# Patient Record
Sex: FEMALE | Race: WHITE | NOT HISPANIC OR LATINO | Employment: FULL TIME | RURAL
[De-identification: names, ages, dates, MRNs, and addresses within clinical notes are randomized per-mention and may not be internally consistent; named-entity substitution may affect disease eponyms.]

---

## 2022-08-15 ENCOUNTER — OFFICE VISIT (OUTPATIENT)
Dept: FAMILY MEDICINE | Facility: CLINIC | Age: 27
End: 2022-08-15
Payer: COMMERCIAL

## 2022-08-15 VITALS
TEMPERATURE: 99 F | WEIGHT: 134 LBS | HEART RATE: 78 BPM | RESPIRATION RATE: 18 BRPM | OXYGEN SATURATION: 98 % | BODY MASS INDEX: 27.01 KG/M2 | HEIGHT: 59 IN

## 2022-08-15 DIAGNOSIS — L70.0 ACNE VULGARIS: Primary | Chronic | ICD-10-CM

## 2022-08-15 PROCEDURE — 99203 PR OFFICE/OUTPT VISIT, NEW, LEVL III, 30-44 MIN: ICD-10-PCS | Mod: ,,, | Performed by: FAMILY MEDICINE

## 2022-08-15 PROCEDURE — 99203 OFFICE O/P NEW LOW 30 MIN: CPT | Mod: ,,, | Performed by: FAMILY MEDICINE

## 2022-08-15 PROCEDURE — 1160F PR REVIEW ALL MEDS BY PRESCRIBER/CLIN PHARMACIST DOCUMENTED: ICD-10-PCS | Mod: CPTII,,, | Performed by: FAMILY MEDICINE

## 2022-08-15 PROCEDURE — 3008F PR BODY MASS INDEX (BMI) DOCUMENTED: ICD-10-PCS | Mod: CPTII,,, | Performed by: FAMILY MEDICINE

## 2022-08-15 PROCEDURE — 1159F MED LIST DOCD IN RCRD: CPT | Mod: CPTII,,, | Performed by: FAMILY MEDICINE

## 2022-08-15 PROCEDURE — 1159F PR MEDICATION LIST DOCUMENTED IN MEDICAL RECORD: ICD-10-PCS | Mod: CPTII,,, | Performed by: FAMILY MEDICINE

## 2022-08-15 PROCEDURE — 1160F RVW MEDS BY RX/DR IN RCRD: CPT | Mod: CPTII,,, | Performed by: FAMILY MEDICINE

## 2022-08-15 PROCEDURE — 3008F BODY MASS INDEX DOCD: CPT | Mod: CPTII,,, | Performed by: FAMILY MEDICINE

## 2022-08-15 RX ORDER — DOXYCYCLINE 100 MG/1
CAPSULE ORAL
Qty: 60 CAPSULE | Refills: 5 | Status: SHIPPED | OUTPATIENT
Start: 2022-08-15 | End: 2023-04-03

## 2022-08-15 RX ORDER — CLINDAMYCIN PHOSPHATE 10 MG/ML
SOLUTION TOPICAL 2 TIMES DAILY
Qty: 120 EACH | Refills: 11 | Status: SHIPPED | OUTPATIENT
Start: 2022-08-15 | End: 2023-08-15

## 2022-08-15 RX ORDER — ERYTHROMYCIN AND BENZOYL PEROXIDE 30; 50 MG/G; MG/G
GEL TOPICAL 2 TIMES DAILY
Qty: 46.6 G | Refills: 11 | Status: SHIPPED | OUTPATIENT
Start: 2022-08-15 | End: 2023-04-03

## 2022-08-15 NOTE — PROGRESS NOTES
"Subjective:       Patient ID: Dominique Nicolas is a 27 y.o. female.    Chief Complaint: Establish Care    28 yo WF here to get established again. Needs refills on meds for acne. Having a lot of trouble in the last few years and worse in the last few months.     Review of Systems   Constitutional: Negative for activity change.   Eyes: Negative for visual disturbance.   Respiratory: Negative for shortness of breath.    Cardiovascular: Negative for chest pain.   Gastrointestinal: Negative for abdominal pain.   Integumentary:         Acne of the face   Neurological: Negative for headaches.   Psychiatric/Behavioral: Negative for dysphoric mood.         Objective:     Pulse 78, temperature 98.9 °F (37.2 °C), temperature source Oral, resp. rate 18, height 4' 11" (1.499 m), weight 60.8 kg (134 lb), last menstrual period 08/15/2022, SpO2 98 %.      Physical Exam  Constitutional:       Appearance: Normal appearance.   HENT:      Head: Normocephalic and atraumatic.      Nose: Nose normal.      Mouth/Throat:      Mouth: Mucous membranes are moist.   Eyes:      Pupils: Pupils are equal, round, and reactive to light.   Cardiovascular:      Rate and Rhythm: Normal rate and regular rhythm.      Pulses: Normal pulses.      Heart sounds: Normal heart sounds.   Pulmonary:      Effort: Pulmonary effort is normal.      Breath sounds: Normal breath sounds.   Abdominal:      General: Abdomen is flat.   Skin:     General: Skin is warm.      Findings: Lesion (acne scars on face) present.   Neurological:      General: No focal deficit present.      Mental Status: She is alert and oriented to person, place, and time.   Psychiatric:         Mood and Affect: Mood normal.         Behavior: Behavior normal.         Thought Content: Thought content normal.         Judgment: Judgment normal.         Assessment:       Problem List Items Addressed This Visit        Derm    Acne vulgaris - Primary (Chronic)    Relevant Medications    clindamycin " (CLEOCIN T) 1 % Swab    benzoyl peroxide-erythromycin (BENZAMYCIN) gel    doxycycline (MONODOX) 100 MG capsule          Plan:     RTC prn. Consider lab. Consider derm referral.

## 2022-08-16 PROBLEM — L70.0 ACNE VULGARIS: Chronic | Status: ACTIVE | Noted: 2022-08-16

## 2023-01-18 ENCOUNTER — OFFICE VISIT (OUTPATIENT)
Dept: OBSTETRICS AND GYNECOLOGY | Facility: CLINIC | Age: 28
End: 2023-01-18
Payer: COMMERCIAL

## 2023-01-18 VITALS
OXYGEN SATURATION: 99 % | DIASTOLIC BLOOD PRESSURE: 70 MMHG | HEART RATE: 68 BPM | SYSTOLIC BLOOD PRESSURE: 110 MMHG | BODY MASS INDEX: 27.01 KG/M2 | WEIGHT: 134 LBS | RESPIRATION RATE: 18 BRPM | HEIGHT: 59 IN

## 2023-01-18 DIAGNOSIS — Z01.419 NORMAL GYNECOLOGIC EXAMINATION: Primary | ICD-10-CM

## 2023-01-18 PROCEDURE — 3008F PR BODY MASS INDEX (BMI) DOCUMENTED: ICD-10-PCS | Mod: CPTII,,, | Performed by: ADVANCED PRACTICE MIDWIFE

## 2023-01-18 PROCEDURE — 88142 CYTOPATH C/V THIN LAYER: CPT | Mod: TC,GCY | Performed by: ADVANCED PRACTICE MIDWIFE

## 2023-01-18 PROCEDURE — 1159F PR MEDICATION LIST DOCUMENTED IN MEDICAL RECORD: ICD-10-PCS | Mod: CPTII,,, | Performed by: ADVANCED PRACTICE MIDWIFE

## 2023-01-18 PROCEDURE — 1159F MED LIST DOCD IN RCRD: CPT | Mod: CPTII,,, | Performed by: ADVANCED PRACTICE MIDWIFE

## 2023-01-18 PROCEDURE — 3078F DIAST BP <80 MM HG: CPT | Mod: CPTII,,, | Performed by: ADVANCED PRACTICE MIDWIFE

## 2023-01-18 PROCEDURE — 99213 OFFICE O/P EST LOW 20 MIN: CPT | Mod: PBBFAC | Performed by: ADVANCED PRACTICE MIDWIFE

## 2023-01-18 PROCEDURE — 99385 PR PREVENTIVE VISIT,NEW,18-39: ICD-10-PCS | Mod: S$PBB,,, | Performed by: ADVANCED PRACTICE MIDWIFE

## 2023-01-18 PROCEDURE — 99385 PREV VISIT NEW AGE 18-39: CPT | Mod: S$PBB,,, | Performed by: ADVANCED PRACTICE MIDWIFE

## 2023-01-18 PROCEDURE — 3008F BODY MASS INDEX DOCD: CPT | Mod: CPTII,,, | Performed by: ADVANCED PRACTICE MIDWIFE

## 2023-01-18 PROCEDURE — 3074F PR MOST RECENT SYSTOLIC BLOOD PRESSURE < 130 MM HG: ICD-10-PCS | Mod: CPTII,,, | Performed by: ADVANCED PRACTICE MIDWIFE

## 2023-01-18 PROCEDURE — 3078F PR MOST RECENT DIASTOLIC BLOOD PRESSURE < 80 MM HG: ICD-10-PCS | Mod: CPTII,,, | Performed by: ADVANCED PRACTICE MIDWIFE

## 2023-01-18 PROCEDURE — 3074F SYST BP LT 130 MM HG: CPT | Mod: CPTII,,, | Performed by: ADVANCED PRACTICE MIDWIFE

## 2023-01-18 NOTE — PROGRESS NOTES
Subjective:       Patient ID: Dominique Tomlin is a 27 y.o. female.    Chief Complaint: Well Woman (Pt has not been to GYN in 11 years, has not had a pap. Denies any problems. Pt fredo like to talk about getting pregnant.)    Has never had a pap.  Periods are regular but was 1 week late in December.   Has not been on any type of contraception since approx 2018.   Has been using withdrawal method.    is a pharmacist.       Review of Systems   Constitutional: Negative.    HENT: Negative.     Eyes: Negative.    Respiratory: Negative.     Cardiovascular: Negative.    Gastrointestinal: Negative.    Endocrine: Negative.    Genitourinary: Negative.    Musculoskeletal: Negative.    Integumentary:  Negative.   Allergic/Immunologic: Negative.    Neurological: Negative.    Psychiatric/Behavioral: Negative.         Objective:      Physical Exam  Vitals reviewed.   Constitutional:       Appearance: Normal appearance.   HENT:      Head: Normocephalic.   Cardiovascular:      Rate and Rhythm: Normal rate and regular rhythm.   Pulmonary:      Effort: Pulmonary effort is normal.      Breath sounds: Normal breath sounds.   Chest:   Breasts:     Right: Normal. No mass.      Left: Normal. No mass.   Abdominal:      General: Abdomen is flat.      Palpations: Abdomen is soft.   Genitourinary:     General: Normal vulva.      Exam position: Lithotomy position.      Vagina: Normal.      Cervix: No cervical motion tenderness.      Uterus: Normal. Not tender.       Adnexa: Right adnexa normal and left adnexa normal.        Right: No mass.          Left: No mass.     Musculoskeletal:         General: Normal range of motion.      Cervical back: Normal range of motion.   Skin:     General: Skin is warm and dry.   Neurological:      Mental Status: She is alert and oriented to person, place, and time.   Psychiatric:         Mood and Affect: Mood normal.         Behavior: Behavior normal.       Assessment:       Problem List Items Addressed  This Visit    None  Visit Diagnoses       Normal gynecologic examination    -  Primary    Relevant Orders    ThinPrep Pap Test              Plan:       Take PN vitamin daily  Keep bleeding calendar.  Discussed  timed SA.  If no pregnancy in 6 months of actively trying, come in for US and labs.

## 2023-01-20 LAB
GH SERPL-MCNC: NORMAL NG/ML
INSULIN SERPL-ACNC: NORMAL U[IU]/ML
LAB AP CLINICAL INFORMATION: NORMAL
LAB AP GYN INTERPRETATION: NEGATIVE
LAB AP PAP DISCLAIMER COMMENTS: NORMAL
RENIN PLAS-CCNC: NORMAL NG/ML/H

## 2023-02-07 DIAGNOSIS — Z36.89 ENCOUNTER TO ESTABLISH GESTATIONAL AGE USING ULTRASOUND: Primary | ICD-10-CM

## 2023-02-22 ENCOUNTER — HOSPITAL ENCOUNTER (OUTPATIENT)
Dept: RADIOLOGY | Facility: HOSPITAL | Age: 28
Discharge: HOME | End: 2023-02-22
Attending: ADVANCED PRACTICE MIDWIFE
Payer: COMMERCIAL

## 2023-02-22 ENCOUNTER — INITIAL PRENATAL (OUTPATIENT)
Dept: OBSTETRICS AND GYNECOLOGY | Facility: CLINIC | Age: 28
End: 2023-02-22
Payer: COMMERCIAL

## 2023-02-22 VITALS
BODY MASS INDEX: 27.27 KG/M2 | WEIGHT: 135 LBS | DIASTOLIC BLOOD PRESSURE: 70 MMHG | HEART RATE: 70 BPM | SYSTOLIC BLOOD PRESSURE: 102 MMHG

## 2023-02-22 DIAGNOSIS — Z34.01 ENCOUNTER FOR SUPERVISION OF NORMAL FIRST PREGNANCY IN FIRST TRIMESTER: Primary | ICD-10-CM

## 2023-02-22 DIAGNOSIS — Z36.89 ENCOUNTER TO ESTABLISH GESTATIONAL AGE USING ULTRASOUND: ICD-10-CM

## 2023-02-22 LAB
BILIRUB SERPL-MCNC: NORMAL MG/DL
BLOOD URINE, POC: NORMAL
COLOR, POC UA: YELLOW
GLUCOSE UR QL STRIP: NORMAL
KETONES UR QL STRIP: NORMAL
LEUKOCYTE ESTERASE URINE, POC: NORMAL
NITRITE, POC UA: NORMAL
PH, POC UA: 5
PROTEIN, POC: NORMAL
SPECIFIC GRAVITY, POC UA: 1.02
UROBILINOGEN, POC UA: NORMAL

## 2023-02-22 PROCEDURE — 87661 TRICHOMONAS VAGINALIS AMPLIF: CPT | Mod: ,,, | Performed by: CLINICAL MEDICAL LABORATORY

## 2023-02-22 PROCEDURE — 99213 PR OFFICE/OUTPT VISIT, EST, LEVL III, 20-29 MIN: ICD-10-PCS | Mod: S$PBB,,, | Performed by: ADVANCED PRACTICE MIDWIFE

## 2023-02-22 PROCEDURE — 87086 CULTURE, URINE: ICD-10-PCS | Mod: ,,, | Performed by: CLINICAL MEDICAL LABORATORY

## 2023-02-22 PROCEDURE — 87591 N.GONORRHOEAE DNA AMP PROB: CPT | Mod: ,,, | Performed by: CLINICAL MEDICAL LABORATORY

## 2023-02-22 PROCEDURE — 87491 CHLMYD TRACH DNA AMP PROBE: CPT | Mod: ,,, | Performed by: CLINICAL MEDICAL LABORATORY

## 2023-02-22 PROCEDURE — 99213 OFFICE O/P EST LOW 20 MIN: CPT | Mod: S$PBB,,, | Performed by: ADVANCED PRACTICE MIDWIFE

## 2023-02-22 PROCEDURE — 76801 OB US < 14 WKS SINGLE FETUS: CPT | Mod: TC

## 2023-02-22 PROCEDURE — 76801 US OB <14 WEEKS TRANSABDOM, SINGLE GESTATION: ICD-10-PCS | Mod: 26,,, | Performed by: RADIOLOGY

## 2023-02-22 PROCEDURE — 87661 TRICHOMONAS VAGINALIS BY PCR: ICD-10-PCS | Mod: ,,, | Performed by: CLINICAL MEDICAL LABORATORY

## 2023-02-22 PROCEDURE — 76801 OB US < 14 WKS SINGLE FETUS: CPT | Mod: 26,,, | Performed by: RADIOLOGY

## 2023-02-22 PROCEDURE — 81003 URINALYSIS AUTO W/O SCOPE: CPT | Mod: PBBFAC | Performed by: ADVANCED PRACTICE MIDWIFE

## 2023-02-22 PROCEDURE — 87086 URINE CULTURE/COLONY COUNT: CPT | Mod: ,,, | Performed by: CLINICAL MEDICAL LABORATORY

## 2023-02-22 PROCEDURE — 99213 OFFICE O/P EST LOW 20 MIN: CPT | Mod: PBBFAC,25 | Performed by: ADVANCED PRACTICE MIDWIFE

## 2023-02-22 PROCEDURE — 87591 CHLAMYDIA/GONORRHOEAE(GC), PCR: ICD-10-PCS | Mod: ,,, | Performed by: CLINICAL MEDICAL LABORATORY

## 2023-02-22 PROCEDURE — 87491 CHLAMYDIA/GONORRHOEAE(GC), PCR: ICD-10-PCS | Mod: ,,, | Performed by: CLINICAL MEDICAL LABORATORY

## 2023-02-22 NOTE — PROGRESS NOTES
Subjective:      Dominique Tomlin is being seen today for her first obstetrical visit.  This is a planned pregnancy. She is at  7w 4d gestation. Her obstetrical history is significant for primigravida. Relationship with FOB: spouse, living together. Patient does intend to breast feed. Pregnancy history fully reviewed.  Denies vaginal bleeding, abd pain or cramping.    Menstrual History:  OB History        1    Para   0    Term   0       0    AB   0    Living   0       SAB   0    IAB   0    Ectopic   0    Multiple   0    Live Births   0                  Patient's last menstrual period was 2022 (exact date).  EDC by LMP 10/7/23 (FINAL)  US today  FINDINGS:  Uterus is 10.0 cm in length. Gestational sac is present with fetal pole measurements estimating age at 7 weeks 3 days.  Fetal heart rate is 155 beats per minute. No abnormality is visualized around the gestational sac. Amniotic fluid volume appears within normal limits. Placenta appears within normal limits. The right ovary has a simple appearing cyst 2.2 x 2.2 x 2.1 cm.  Left ovary appears normal.  Cervical length 3.6 cm.  No free fluid is seen.    The following portions of the patient's history were reviewed and updated as appropriate: allergies, current medications, past family history, past medical history, past social history, past surgical history and problem list.    Review of Systems  Pertinent items are noted in HPI.      Objective:      /70   Pulse 70   Wt 61.2 kg (135 lb)   LMP 2022 (Exact Date)   BMI 27.27 kg/m²   General appearance: alert, appears stated age and cooperative  Head: Normocephalic, without obvious abnormality, atraumatic  Lungs: clear to auscultation bilaterally  Heart: regular rate and rhythm  Abdomen: soft, non-tender; bowel sounds normal; no masses,  no organomegaly  Extremities: extremities normal, atraumatic, no cyanosis or edema  Skin: Skin color, texture, turgor normal. No rashes or lesions       Assessment:     Secondary Amenorrhea   Pregnancy at 7 and 4/7 weeks      Plan:      Initial labs drawn.  Alonso Screen discussed.  Prenatal vitamins.  Problem list reviewed and updated.  New OB booklet given to pt to review.  Discussed midwifery care in collaboration with Dr Sosa.  Reviewed healthy diet, exercise during pregnancy and weight gain.  Reviewed COVID precautions. Discussed danger s/s to report including bleeding precautions.    Follow up in 4 weeks.

## 2023-02-23 LAB
CHLAMYDIA BY PCR: NEGATIVE
N. GONORRHOEAE (GC) BY PCR: NEGATIVE
TRICHOMONAS NAT: NEGATIVE

## 2023-02-24 LAB — UA COMPLETE W REFLEX CULTURE PNL UR: NORMAL

## 2023-03-02 ENCOUNTER — TELEPHONE (OUTPATIENT)
Dept: OBSTETRICS AND GYNECOLOGY | Facility: CLINIC | Age: 28
End: 2023-03-02
Payer: COMMERCIAL

## 2023-03-02 NOTE — TELEPHONE ENCOUNTER
Call to pt re:  need to come by lab to leave another urine sample - lab called and stated it returned as a QNS (qty not sufficient) - they are having to send samples out while their equipment is currently not working.  Patient voiced agreement and understanding and will come by tomorrow 3/03 to go to the lab to leave a repeat urine.

## 2023-03-22 ENCOUNTER — ROUTINE PRENATAL (OUTPATIENT)
Dept: OBSTETRICS AND GYNECOLOGY | Facility: CLINIC | Age: 28
End: 2023-03-22
Payer: COMMERCIAL

## 2023-03-22 VITALS
WEIGHT: 138 LBS | SYSTOLIC BLOOD PRESSURE: 110 MMHG | BODY MASS INDEX: 27.87 KG/M2 | DIASTOLIC BLOOD PRESSURE: 70 MMHG | HEART RATE: 72 BPM

## 2023-03-22 DIAGNOSIS — Z3A.11 11 WEEKS GESTATION OF PREGNANCY: ICD-10-CM

## 2023-03-22 DIAGNOSIS — Z34.01 ENCOUNTER FOR SUPERVISION OF NORMAL FIRST PREGNANCY IN FIRST TRIMESTER: Primary | ICD-10-CM

## 2023-03-22 DIAGNOSIS — R35.0 URINARY FREQUENCY: ICD-10-CM

## 2023-03-22 LAB
BILIRUB SERPL-MCNC: NORMAL MG/DL
BLOOD URINE, POC: NORMAL
COLOR, POC UA: NORMAL
GLUCOSE UR QL STRIP: NORMAL
KETONES UR QL STRIP: NORMAL
LEUKOCYTE ESTERASE URINE, POC: NORMAL
NITRITE, POC UA: NORMAL
PH, POC UA: 5
PROTEIN, POC: NORMAL
SPECIFIC GRAVITY, POC UA: 1.01
UROBILINOGEN, POC UA: NORMAL

## 2023-03-22 PROCEDURE — 0502F SUBSEQUENT PRENATAL CARE: CPT | Mod: CPTII,,, | Performed by: ADVANCED PRACTICE MIDWIFE

## 2023-03-22 PROCEDURE — 81003 URINALYSIS AUTO W/O SCOPE: CPT | Mod: PBBFAC | Performed by: ADVANCED PRACTICE MIDWIFE

## 2023-03-22 PROCEDURE — 0502F PR SUBSEQUENT PRENATAL CARE: ICD-10-PCS | Mod: CPTII,,, | Performed by: ADVANCED PRACTICE MIDWIFE

## 2023-03-22 PROCEDURE — 99213 OFFICE O/P EST LOW 20 MIN: CPT | Mod: PBBFAC | Performed by: ADVANCED PRACTICE MIDWIFE

## 2023-03-22 NOTE — PROGRESS NOTES
28 y.o. female  at 11w4d   Alonso screen drawn today  Denies any vaginal bleeding, leakage of fluid, cramping, contractions, or pressure.   Total weight gain/weight loss in pregnancy: 1.361 kg (3 lb)     Vitals  BP: 110/70  Pulse: 72  Weight: 62.6 kg (138 lb)  Prenatal  Fetal Heart Rate: 163  Movement: Absent  Edema  LLE Edema: None  RLE Edema: None    Prenatal Labs:  Lab Results   Component Value Date    GROUPTRH O POS 2023    HGB 14.3 2023    HCT 43.0 2023     (L) 2023    HEPBSAG Non-Reactive 2023    [id number] Non-Reactive 2023    LABNGO Negative 2023    LABURIN Skin/Urogenital Meme Isolated, no further workup. 2023       A: 11w4d           ICD-10-CM ICD-9-CM    1. Encounter for supervision of normal first pregnancy in first trimester  Z34.01 V22.0 POCT URINALYSIS W/O SCOPE      2. 11 weeks gestation of pregnancy  Z3A.11 V22.2 POCT URINALYSIS W/O SCOPE      3. Urinary frequency  R35.0 788.41 POCT URINALYSIS W/O SCOPE          P: Bleeding precautions discussed.    Questions answered to desired level of satisfaction  Verbalized understanding to all information and instructions provided.  Follow up in about 5 weeks (around 2023) for OBV.    Nunu Chandra CNM, DA-BC

## 2023-04-03 ENCOUNTER — OFFICE VISIT (OUTPATIENT)
Dept: FAMILY MEDICINE | Facility: CLINIC | Age: 28
End: 2023-04-03
Payer: COMMERCIAL

## 2023-04-03 VITALS
BODY MASS INDEX: 27.62 KG/M2 | HEART RATE: 96 BPM | WEIGHT: 137 LBS | HEIGHT: 59 IN | RESPIRATION RATE: 20 BRPM | SYSTOLIC BLOOD PRESSURE: 112 MMHG | DIASTOLIC BLOOD PRESSURE: 62 MMHG | TEMPERATURE: 98 F | OXYGEN SATURATION: 98 %

## 2023-04-03 DIAGNOSIS — J06.9 UPPER RESPIRATORY TRACT INFECTION, UNSPECIFIED TYPE: Primary | ICD-10-CM

## 2023-04-03 LAB
CTP QC/QA: YES
RSV RAPID ANTIGEN: NEGATIVE

## 2023-04-03 PROCEDURE — 3078F PR MOST RECENT DIASTOLIC BLOOD PRESSURE < 80 MM HG: ICD-10-PCS | Mod: CPTII,,, | Performed by: NURSE PRACTITIONER

## 2023-04-03 PROCEDURE — 3074F SYST BP LT 130 MM HG: CPT | Mod: CPTII,,, | Performed by: NURSE PRACTITIONER

## 2023-04-03 PROCEDURE — 3008F BODY MASS INDEX DOCD: CPT | Mod: CPTII,,, | Performed by: NURSE PRACTITIONER

## 2023-04-03 PROCEDURE — 87807 RSV ASSAY W/OPTIC: CPT | Mod: QW,,, | Performed by: NURSE PRACTITIONER

## 2023-04-03 PROCEDURE — 99213 PR OFFICE/OUTPT VISIT, EST, LEVL III, 20-29 MIN: ICD-10-PCS | Mod: ,,, | Performed by: NURSE PRACTITIONER

## 2023-04-03 PROCEDURE — 87807 POCT RESPIRATORY SYNCYTIAL VIRUS: ICD-10-PCS | Mod: QW,,, | Performed by: NURSE PRACTITIONER

## 2023-04-03 PROCEDURE — 3008F PR BODY MASS INDEX (BMI) DOCUMENTED: ICD-10-PCS | Mod: CPTII,,, | Performed by: NURSE PRACTITIONER

## 2023-04-03 PROCEDURE — 99213 OFFICE O/P EST LOW 20 MIN: CPT | Mod: ,,, | Performed by: NURSE PRACTITIONER

## 2023-04-03 PROCEDURE — 3078F DIAST BP <80 MM HG: CPT | Mod: CPTII,,, | Performed by: NURSE PRACTITIONER

## 2023-04-03 PROCEDURE — 87081 CULTURE SCREEN ONLY: CPT | Mod: ,,, | Performed by: CLINICAL MEDICAL LABORATORY

## 2023-04-03 PROCEDURE — 3074F PR MOST RECENT SYSTOLIC BLOOD PRESSURE < 130 MM HG: ICD-10-PCS | Mod: CPTII,,, | Performed by: NURSE PRACTITIONER

## 2023-04-03 PROCEDURE — 87081 CULTURE, STREP A,  THROAT: ICD-10-PCS | Mod: ,,, | Performed by: CLINICAL MEDICAL LABORATORY

## 2023-04-03 RX ORDER — AMOXICILLIN 500 MG/1
500 CAPSULE ORAL EVERY 12 HOURS
Status: ON HOLD | COMMUNITY
Start: 2023-03-30 | End: 2023-10-05 | Stop reason: HOSPADM

## 2023-04-03 NOTE — PROGRESS NOTES
Nunu Guerra, AMANDA   [facility]      PATIENT NAME: Dominique Tomlin  : 1995  DATE: 4/3/23  MRN: [id number]      Patient PCP Information       Provider PCP Type    Anita Celis MD General            Reason for Visit / Chief Complaint: URI (Another clinic tested for covid/flu, and strep but all came back neg./States RSV has been going around and would like to get it checked out.//Wakes up drenched in sweat. Woke up to not being able to breathe.) and Nasal Congestion (States the bath with the steam helps. /ROOM 3)         History of Present Illness / Problem Focused Workflow     Dominique Tomlin presents to the clinic with URI (Another clinic tested for covid/flu, and strep but all came back neg./States RSV has been going around and would like to get it checked out.//Wakes up drenched in sweat. Woke up to not being able to breathe.) and Nasal Congestion (States the bath with the steam helps. /ROOM 3)     HPI  29 yo female who presents to clinic with increased sinus congestion, sweating, sore throat. Patient states she was seen at outside clinic 3/30/2023, states she was negative for covid/flu and strep. Patient was prescribed amoxicillin. Patient states she did start antibiotics however stopped abx prior to completion as she did not feel symptoms were improving.   Patient voiced concerns for RSV.       Review of Systems     Review of Systems   Constitutional: Negative.         Sweating   HENT:  Positive for congestion, postnasal drip, rhinorrhea, sinus pressure, sneezing and sore throat.    Eyes: Negative.    Respiratory:  Positive for cough.    Cardiovascular: Negative.    Gastrointestinal: Negative.    Endocrine: Negative.    Genitourinary: Negative.    Musculoskeletal: Negative.    Skin: Negative.    Allergic/Immunologic: Negative.    Neurological: Negative.    Hematological: Negative.    Psychiatric/Behavioral: Negative.       Medical / Social / Family History     Past Medical History:  "  Diagnosis Date    Acne        No past surgical history on file.    Social History  Ms.  reports that she has never smoked. She has never used smokeless tobacco. She reports that she does not drink alcohol and does not use drugs.    Family History  Ms.'s family history includes Prostate cancer in her maternal grandfather.    Medications and Allergies     Medications  Outpatient Medications Marked as Taking for the 4/3/23 encounter (Office Visit) with AMANDA Best   Medication Sig Dispense Refill    clindamycin (CLEOCIN T) 1 % Swab Apply topically 2 (two) times daily. 120 each 11    prenatal vit no.124/iron/folic (PRENATAL VITAMIN ORAL) Take 1 tablet by mouth once daily.         Allergies  Review of patient's allergies indicates:  No Known Allergies    Physical Examination     Vitals:    04/03/23 1412   BP: 112/62   BP Location: Left arm   Patient Position: Sitting   BP Method: Medium (Manual)   Pulse: 96   Resp: 20   Temp: 98.4 °F (36.9 °C)   TempSrc: Oral   SpO2: 98%   Weight: 62.1 kg (137 lb)   Height: 4' 11" (1.499 m)       Physical Exam  Vitals and nursing note reviewed.   Constitutional:       Appearance: Normal appearance. She is normal weight.   HENT:      Head: Normocephalic.      Right Ear: Tympanic membrane, ear canal and external ear normal.      Left Ear: Tympanic membrane, ear canal and external ear normal.      Nose: Congestion present.      Mouth/Throat:      Mouth: Mucous membranes are moist.      Pharynx: No oropharyngeal exudate or posterior oropharyngeal erythema.   Eyes:      Extraocular Movements: Extraocular movements intact.      Conjunctiva/sclera: Conjunctivae normal.      Pupils: Pupils are equal, round, and reactive to light.   Cardiovascular:      Rate and Rhythm: Normal rate and regular rhythm.      Pulses: Normal pulses.      Heart sounds: Normal heart sounds. No murmur heard.  Pulmonary:      Effort: Pulmonary effort is normal.      Breath sounds: Normal breath sounds. No " stridor. No wheezing or rhonchi.   Abdominal:      General: Bowel sounds are normal. There is no distension.      Palpations: Abdomen is soft. There is no mass.      Tenderness: There is no abdominal tenderness.   Musculoskeletal:         General: No swelling or tenderness. Normal range of motion.      Cervical back: Normal range of motion and neck supple.      Right lower leg: No edema.      Left lower leg: No edema.   Skin:     General: Skin is warm and dry.      Capillary Refill: Capillary refill takes less than 2 seconds.   Neurological:      General: No focal deficit present.      Mental Status: She is alert and oriented to person, place, and time. Mental status is at baseline.      Cranial Nerves: No cranial nerve deficit.      Sensory: No sensory deficit.      Motor: No weakness.   Psychiatric:         Mood and Affect: Mood normal.         Behavior: Behavior normal.         Thought Content: Thought content normal.         Judgment: Judgment normal.         Routine Prenatal on 03/22/2023   Component Date Value Ref Range Status    Color, UA 03/22/2023 Dark Yellow   Final    Spec Grav UA 03/22/2023 1.015   Final    pH, UA 03/22/2023 5   Final    WBC, UA 03/22/2023 -   Final    Nitrite, UA 03/22/2023 -   Final    Protein, POC 03/22/2023 -   Final    Glucose, UA 03/22/2023 -   Final    Ketones, UA 03/22/2023 trace   Final    Bilirubin, POC 03/22/2023 -   Final    Urobilinogen, UA 03/22/2023 -   Final    Blood, UA 03/22/2023 -   Final             Assessment and Plan (including Health Maintenance)       Plan:   Upper respiratory tract infection, unspecified type  -     POCT respiratory syncytial virus  -     Strep A culture, throat; Future; Expected date: 04/03/2023     Continue amoxicillin as previously ordered by outside provider  Rtc if symptoms fail to improve  RSV negative, strep cx negative   There are no Patient Instructions on file for this visit.       Health Maintenance Due   Topic Date Due    Lipid Panel   Never done    TETANUS VACCINE  Never done    COVID-19 Vaccine (3 - Booster for Moderna series) 01/14/2022    Influenza Vaccine (1) Never done       Most Recent Immunizations   Administered Date(s) Administered    COVID-19, MRNA, LN-S, PF (MODERNA FULL 0.5 ML DOSE) 11/19/2021        Problem List Items Addressed This Visit    None  Visit Diagnoses       Upper respiratory tract infection, unspecified type    -  Primary    Relevant Orders    POCT respiratory syncytial virus    Strep A culture, throat            Health Maintenance Topics with due status: Not Due       Topic Last Completion Date    Pap Smear 01/18/2023       Future Appointments   Date Time Provider Department Center   4/25/2023  9:30 AM Nunu Chandra CNM OB OBGYN [facility]            Signature:  AMANDA Best [facility]     Date of encounter: 4/3/23

## 2023-04-06 LAB — DEPRECATED S PYO AG THROAT QL EIA: NORMAL

## 2023-04-25 ENCOUNTER — ROUTINE PRENATAL (OUTPATIENT)
Dept: OBSTETRICS AND GYNECOLOGY | Facility: CLINIC | Age: 28
End: 2023-04-25
Payer: COMMERCIAL

## 2023-04-25 VITALS
WEIGHT: 140 LBS | SYSTOLIC BLOOD PRESSURE: 100 MMHG | DIASTOLIC BLOOD PRESSURE: 62 MMHG | BODY MASS INDEX: 28.28 KG/M2 | HEART RATE: 75 BPM

## 2023-04-25 DIAGNOSIS — Z03.79 ENCOUNTER FOR OTHER SUSPECTED MATERNAL AND FETAL CONDITIONS RULED OUT: ICD-10-CM

## 2023-04-25 DIAGNOSIS — R35.0 URINARY FREQUENCY: ICD-10-CM

## 2023-04-25 DIAGNOSIS — Z3A.16 16 WEEKS GESTATION OF PREGNANCY: ICD-10-CM

## 2023-04-25 DIAGNOSIS — Z34.02 ENCOUNTER FOR SUPERVISION OF NORMAL FIRST PREGNANCY IN SECOND TRIMESTER: Primary | ICD-10-CM

## 2023-04-25 LAB
BILIRUB SERPL-MCNC: NORMAL MG/DL
BLOOD URINE, POC: NORMAL
COLOR, POC UA: YELLOW
GLUCOSE UR QL STRIP: NORMAL
KETONES UR QL STRIP: NORMAL
LEUKOCYTE ESTERASE URINE, POC: NORMAL
NITRITE, POC UA: NORMAL
PH, POC UA: 7.5
PROTEIN, POC: NORMAL
SPECIFIC GRAVITY, POC UA: 1.02
UROBILINOGEN, POC UA: NORMAL

## 2023-04-25 PROCEDURE — 0502F PR SUBSEQUENT PRENATAL CARE: ICD-10-PCS | Mod: CPTII,,, | Performed by: ADVANCED PRACTICE MIDWIFE

## 2023-04-25 PROCEDURE — 99213 OFFICE O/P EST LOW 20 MIN: CPT | Mod: PBBFAC | Performed by: ADVANCED PRACTICE MIDWIFE

## 2023-04-25 PROCEDURE — 0502F SUBSEQUENT PRENATAL CARE: CPT | Mod: CPTII,,, | Performed by: ADVANCED PRACTICE MIDWIFE

## 2023-04-25 PROCEDURE — 81003 URINALYSIS AUTO W/O SCOPE: CPT | Mod: PBBFAC | Performed by: ADVANCED PRACTICE MIDWIFE

## 2023-04-25 NOTE — PROGRESS NOTES
28 y.o. female  at 16w3d   She c/o recently having URI.  States she tested negative for Covid, Flu and Strep.  Reports some fluttering. Denies any vaginal bleeding, leakage of fluid, cramping, contractions, or pressure.   Total weight gain/weight loss in pregnancy: 2.268 kg (5 lb)     Vitals  BP: 100/62  Pulse: 75  Weight: 63.5 kg (140 lb)  Prenatal  Fetal Heart Rate: 152  Movement: Absent  Edema  LLE Edema: None  RLE Edema: None  Facial: None  Additional Edema?: No   Lungs clear to ascultation bilaterally.     Prenatal Labs:  Lab Results   Component Value Date    GROUPTRH O POS 2023    HGB 14.3 2023    HCT 43.0 2023     (L) 2023    HEPBSAG Non-Reactive 2023    [id number] Non-Reactive 2023    LABNGO Negative 2023    LABURIN Skin/Urogenital Meme Isolated, no further workup. 2023       A: 16w3d           ICD-10-CM ICD-9-CM    1. Encounter for supervision of normal first pregnancy in second trimester  Z34.02 V22.0       2. 16 weeks gestation of pregnancy  Z3A.16 V22.2       3. Urinary frequency  R35.0 788.41 POCT URINALYSIS W/O SCOPE      4. Encounter for other suspected maternal and fetal conditions ruled out  Z03.79 V89.09 US OB 14+ Wks, TransAbd, Single Gestation          P: Bleeding precautions discussed.      Questions answered to desired level of satisfaction  Verbalized understanding to all information and instructions provided.  Follow up in about 4 weeks (around 2023) for OBV, US with Pupa.    Nunu Chnadra CNM, DA-BC

## 2023-05-23 ENCOUNTER — ROUTINE PRENATAL (OUTPATIENT)
Dept: OBSTETRICS AND GYNECOLOGY | Facility: CLINIC | Age: 28
End: 2023-05-23
Payer: COMMERCIAL

## 2023-05-23 ENCOUNTER — HOSPITAL ENCOUNTER (OUTPATIENT)
Dept: RADIOLOGY | Facility: HOSPITAL | Age: 28
Discharge: HOME | End: 2023-05-23
Attending: ADVANCED PRACTICE MIDWIFE
Payer: COMMERCIAL

## 2023-05-23 VITALS
HEART RATE: 92 BPM | BODY MASS INDEX: 29.29 KG/M2 | WEIGHT: 145 LBS | DIASTOLIC BLOOD PRESSURE: 51 MMHG | SYSTOLIC BLOOD PRESSURE: 99 MMHG

## 2023-05-23 DIAGNOSIS — R35.0 URINARY FREQUENCY: ICD-10-CM

## 2023-05-23 DIAGNOSIS — Z3A.20 20 WEEKS GESTATION OF PREGNANCY: ICD-10-CM

## 2023-05-23 DIAGNOSIS — Z03.79 ENCOUNTER FOR OTHER SUSPECTED MATERNAL AND FETAL CONDITIONS RULED OUT: ICD-10-CM

## 2023-05-23 DIAGNOSIS — Z34.02 ENCOUNTER FOR SUPERVISION OF NORMAL FIRST PREGNANCY IN SECOND TRIMESTER: Primary | ICD-10-CM

## 2023-05-23 LAB
BILIRUB UR QL STRIP: NEGATIVE
CLARITY UR: CLEAR
COLOR UR: COLORLESS
GLUCOSE UR STRIP-MCNC: NORMAL MG/DL
KETONES UR STRIP-SCNC: NEGATIVE MG/DL
LEUKOCYTE ESTERASE UR QL STRIP: NEGATIVE
MUCOUS, UA: ABNORMAL /LPF
NITRITE UR QL STRIP: NEGATIVE
PH UR STRIP: 7 PH UNITS
PROT UR QL STRIP: NEGATIVE
RBC # UR STRIP: NEGATIVE /UL
RBC #/AREA URNS HPF: 1 /HPF
SP GR UR STRIP: 1.01
SQUAMOUS #/AREA URNS LPF: ABNORMAL /HPF
UROBILINOGEN UR STRIP-ACNC: NORMAL MG/DL
WBC #/AREA URNS HPF: <1 /HPF

## 2023-05-23 PROCEDURE — 76805 OB US >/= 14 WKS SNGL FETUS: CPT | Mod: TC

## 2023-05-23 PROCEDURE — 99213 OFFICE O/P EST LOW 20 MIN: CPT | Mod: PBBFAC,25 | Performed by: ADVANCED PRACTICE MIDWIFE

## 2023-05-23 PROCEDURE — 76805 OB US >/= 14 WKS SNGL FETUS: CPT | Mod: 26,,, | Performed by: RADIOLOGY

## 2023-05-23 PROCEDURE — 81001 URINALYSIS AUTO W/SCOPE: CPT | Mod: ,,, | Performed by: CLINICAL MEDICAL LABORATORY

## 2023-05-23 PROCEDURE — 0502F SUBSEQUENT PRENATAL CARE: CPT | Mod: CPTII,,, | Performed by: ADVANCED PRACTICE MIDWIFE

## 2023-05-23 PROCEDURE — 0502F PR SUBSEQUENT PRENATAL CARE: ICD-10-PCS | Mod: CPTII,,, | Performed by: ADVANCED PRACTICE MIDWIFE

## 2023-05-23 PROCEDURE — 76805 US OB 14+ WEEKS, TRANSABDOM, SINGLE GESTATION: ICD-10-PCS | Mod: 26,,, | Performed by: RADIOLOGY

## 2023-05-23 PROCEDURE — 81001 URINALYSIS, REFLEX TO URINE CULTURE: ICD-10-PCS | Mod: ,,, | Performed by: CLINICAL MEDICAL LABORATORY

## 2023-05-23 NOTE — PROGRESS NOTES
28 y.o. female  at 20w3d   She denies any problems. US with Pupa today prior to appt.   Reports good fetal movement or fluttering. Denies any vaginal bleeding, leakage of fluid, cramping, contractions, or pressure.   Total weight gain/weight loss in pregnancy: 4.536 kg (10 lb)     Vitals  BP: (!) 99/51  Pulse: 92  Weight: 65.8 kg (145 lb)  Prenatal  Fetal Heart Rate: 150  Movement: Present  Edema  LLE Edema: None  RLE Edema: None  Facial: None  Additional Edema?: No    Prenatal Labs:  Lab Results   Component Value Date    GROUPTRH O POS 2023    HGB 14.3 2023    HCT 43.0 2023     (L) 2023    HEPBSAG Non-Reactive 2023    [id number] Non-Reactive 2023    LABNGO Negative 2023    LABURIN Skin/Urogenital Meme Isolated, no further workup. 2023       A: 20w3d           ICD-10-CM ICD-9-CM    1. Encounter for supervision of normal first pregnancy in second trimester  Z34.02 V22.0       2. 20 weeks gestation of pregnancy  Z3A.20 V22.2       3. Urinary frequency  R35.0 788.41 Urinalysis, Reflex to Urine Culture          P: Bleeding precautions discussed.      Questions answered to desired level of satisfaction  Verbalized understanding to all information and instructions provided.  Follow up in about 4 weeks (around 2023) for OBV.    Nunu Chandra CNM, DA-BC

## 2023-06-20 ENCOUNTER — ROUTINE PRENATAL (OUTPATIENT)
Dept: OBSTETRICS AND GYNECOLOGY | Facility: CLINIC | Age: 28
End: 2023-06-20
Payer: COMMERCIAL

## 2023-06-20 VITALS
WEIGHT: 154 LBS | BODY MASS INDEX: 31.1 KG/M2 | DIASTOLIC BLOOD PRESSURE: 70 MMHG | HEART RATE: 78 BPM | SYSTOLIC BLOOD PRESSURE: 110 MMHG

## 2023-06-20 DIAGNOSIS — Z34.02 ENCOUNTER FOR SUPERVISION OF NORMAL FIRST PREGNANCY IN SECOND TRIMESTER: Primary | ICD-10-CM

## 2023-06-20 DIAGNOSIS — Z3A.24 24 WEEKS GESTATION OF PREGNANCY: ICD-10-CM

## 2023-06-20 DIAGNOSIS — R35.0 URINARY FREQUENCY: ICD-10-CM

## 2023-06-20 LAB
BILIRUB SERPL-MCNC: NORMAL MG/DL
BLOOD URINE, POC: NORMAL
COLOR, POC UA: COLORLESS
GLUCOSE UR QL STRIP: NORMAL
KETONES UR QL STRIP: NORMAL
LEUKOCYTE ESTERASE URINE, POC: NORMAL
NITRITE, POC UA: NORMAL
PH, POC UA: 6.5
PROTEIN, POC: NORMAL
SPECIFIC GRAVITY, POC UA: 1.02
UROBILINOGEN, POC UA: NORMAL

## 2023-06-20 PROCEDURE — 99213 OFFICE O/P EST LOW 20 MIN: CPT | Mod: PBBFAC | Performed by: ADVANCED PRACTICE MIDWIFE

## 2023-06-20 PROCEDURE — 81003 URINALYSIS AUTO W/O SCOPE: CPT | Mod: PBBFAC | Performed by: ADVANCED PRACTICE MIDWIFE

## 2023-06-20 PROCEDURE — 0502F PR SUBSEQUENT PRENATAL CARE: ICD-10-PCS | Mod: CPTII,,, | Performed by: ADVANCED PRACTICE MIDWIFE

## 2023-06-20 PROCEDURE — 0502F SUBSEQUENT PRENATAL CARE: CPT | Mod: CPTII,,, | Performed by: ADVANCED PRACTICE MIDWIFE

## 2023-06-20 NOTE — PROGRESS NOTES
28 y.o. female  at 24w3d   She c/o back pain.  Recommended back stretches, chiropractor if needed, & maternity support belt.  Reports good fetal movement or fluttering. Denies any vaginal bleeding, leakage of fluid, cramping, contractions, or pressure.   Total weight gain/weight loss in pregnancy: 8.618 kg (19 lb)     Vitals  BP: 110/70  Pulse: 78  Weight: 69.9 kg (154 lb)  Prenatal  Movement: Present  Edema  LLE Edema: None  RLE Edema: None  Facial: None  Additional Edema?: No    Prenatal Labs:  Lab Results   Component Value Date    GROUPTRH O POS 2023    HGB 14.3 2023    HCT 43.0 2023     (L) 2023    HEPBSAG Non-Reactive 2023    [id number] Non-Reactive 2023    LABNGO Negative 2023    LABURIN Skin/Urogenital Meme Isolated, no further workup. 2023       A: 24w3d           ICD-10-CM ICD-9-CM    1. Encounter for supervision of normal first pregnancy in second trimester  Z34.02 V22.0 Treponema Pallidum (Syphillis) Antibody      CBC Auto Differential      Glucose, 1Hr Post Prandial      2. 24 weeks gestation of pregnancy  Z3A.24 V22.2       3. Urinary frequency  R35.0 788.41 POCT URINALYSIS W/O SCOPE          P: Bleeding and  labor/labor precautions discussed.      Questions answered to desired level of satisfaction  Verbalized understanding to all information and instructions provided.  Follow up in about 4 weeks (around 2023) for OBV, 1 hr GTT.    Nunu Chandra CNM, DA-BC

## 2023-07-18 ENCOUNTER — ROUTINE PRENATAL (OUTPATIENT)
Dept: OBSTETRICS AND GYNECOLOGY | Facility: CLINIC | Age: 28
End: 2023-07-18
Payer: COMMERCIAL

## 2023-07-18 VITALS
SYSTOLIC BLOOD PRESSURE: 106 MMHG | HEART RATE: 96 BPM | DIASTOLIC BLOOD PRESSURE: 66 MMHG | WEIGHT: 156 LBS | BODY MASS INDEX: 31.51 KG/M2

## 2023-07-18 DIAGNOSIS — Z34.03 ENCOUNTER FOR SUPERVISION OF NORMAL FIRST PREGNANCY IN THIRD TRIMESTER: Primary | ICD-10-CM

## 2023-07-18 DIAGNOSIS — Z3A.28 28 WEEKS GESTATION OF PREGNANCY: ICD-10-CM

## 2023-07-18 DIAGNOSIS — R35.0 URINARY FREQUENCY: ICD-10-CM

## 2023-07-18 LAB
BILIRUB SERPL-MCNC: NORMAL MG/DL
BLOOD URINE, POC: NORMAL
COLOR, POC UA: NORMAL
GLUCOSE UR QL STRIP: NORMAL
KETONES UR QL STRIP: NORMAL
LEUKOCYTE ESTERASE URINE, POC: NORMAL
NITRITE, POC UA: NORMAL
PH, POC UA: 7
PROTEIN, POC: NORMAL
SPECIFIC GRAVITY, POC UA: 1.02
UROBILINOGEN, POC UA: NORMAL

## 2023-07-18 PROCEDURE — 81003 URINALYSIS AUTO W/O SCOPE: CPT | Mod: PBBFAC | Performed by: ADVANCED PRACTICE MIDWIFE

## 2023-07-18 PROCEDURE — 0502F PR SUBSEQUENT PRENATAL CARE: ICD-10-PCS | Mod: CPTII,,, | Performed by: ADVANCED PRACTICE MIDWIFE

## 2023-07-18 PROCEDURE — 0502F SUBSEQUENT PRENATAL CARE: CPT | Mod: CPTII,,, | Performed by: ADVANCED PRACTICE MIDWIFE

## 2023-07-18 PROCEDURE — 99213 OFFICE O/P EST LOW 20 MIN: CPT | Mod: PBBFAC | Performed by: ADVANCED PRACTICE MIDWIFE

## 2023-07-18 NOTE — PROGRESS NOTES
28 y.o. female  at 28w3d   She denies any problems.   Reports good fetal movement or fluttering. Denies any vaginal bleeding, leakage of fluid, cramping, contractions, or pressure.   Total weight gain/weight loss in pregnancy: 9.526 kg (21 lb)     Vitals  BP: 106/66  Pulse: 96  Weight: 70.8 kg (156 lb)  Prenatal  Fetal Heart Rate: 130  Movement: Present  Edema  LLE Edema: None  RLE Edema: None  Facial: None  Additional Edema?: No    Prenatal Labs:  Lab Results   Component Value Date    GROUPTRH O POS 2023    HGB 14.3 2023    HCT 43.0 2023     (L) 2023    HEPBSAG Non-Reactive 2023    [id number] Non-Reactive 2023    LABNGO Negative 2023    LABURIN Skin/Urogenital Meme Isolated, no further workup. 2023       A: 28w3d           ICD-10-CM ICD-9-CM    1. Encounter for supervision of normal first pregnancy in third trimester  Z34.03 V22.0 US OB Follow-up Ea Gestation Transabd      2. 28 weeks gestation of pregnancy  Z3A.28 V22.2       3. Urinary frequency  R35.0 788.41 POCT URINALYSIS W/O SCOPE          P: Bleeding, daily fetal kick counts, and  labor/labor precautions discussed.      Questions answered to desired level of satisfaction  Verbalized understanding to all information and instructions provided.  Follow up in about 3 weeks (around 2023) for OBV, US.    Nunu Chandra CNM, DA-BC

## 2023-08-08 ENCOUNTER — ROUTINE PRENATAL (OUTPATIENT)
Dept: OBSTETRICS AND GYNECOLOGY | Facility: CLINIC | Age: 28
End: 2023-08-08
Payer: COMMERCIAL

## 2023-08-08 ENCOUNTER — HOSPITAL ENCOUNTER (OUTPATIENT)
Dept: RADIOLOGY | Facility: HOSPITAL | Age: 28
Discharge: HOME | End: 2023-08-08
Attending: ADVANCED PRACTICE MIDWIFE
Payer: COMMERCIAL

## 2023-08-08 VITALS
WEIGHT: 161 LBS | DIASTOLIC BLOOD PRESSURE: 70 MMHG | SYSTOLIC BLOOD PRESSURE: 110 MMHG | HEART RATE: 89 BPM | BODY MASS INDEX: 32.52 KG/M2

## 2023-08-08 DIAGNOSIS — R35.0 URINARY FREQUENCY: ICD-10-CM

## 2023-08-08 DIAGNOSIS — Z34.03 ENCOUNTER FOR SUPERVISION OF NORMAL FIRST PREGNANCY IN THIRD TRIMESTER: ICD-10-CM

## 2023-08-08 DIAGNOSIS — Z34.03 ENCOUNTER FOR SUPERVISION OF NORMAL FIRST PREGNANCY IN THIRD TRIMESTER: Primary | ICD-10-CM

## 2023-08-08 DIAGNOSIS — Z3A.31 31 WEEKS GESTATION OF PREGNANCY: ICD-10-CM

## 2023-08-08 LAB
BILIRUB SERPL-MCNC: NORMAL MG/DL
BLOOD URINE, POC: NORMAL
COLOR, POC UA: COLORLESS
GLUCOSE UR QL STRIP: NORMAL
KETONES UR QL STRIP: NORMAL
LEUKOCYTE ESTERASE URINE, POC: NORMAL
NITRITE, POC UA: NORMAL
PH, POC UA: 7
PROTEIN, POC: NORMAL
SPECIFIC GRAVITY, POC UA: 1.01
UROBILINOGEN, POC UA: NORMAL

## 2023-08-08 PROCEDURE — 76816 US OB FOLLOW UP EA GESTATION TRANSABDOMINAL: ICD-10-PCS | Mod: 26,,, | Performed by: RADIOLOGY

## 2023-08-08 PROCEDURE — 99213 OFFICE O/P EST LOW 20 MIN: CPT | Mod: PBBFAC | Performed by: ADVANCED PRACTICE MIDWIFE

## 2023-08-08 PROCEDURE — 0502F PR SUBSEQUENT PRENATAL CARE: ICD-10-PCS | Mod: CPTII,,, | Performed by: ADVANCED PRACTICE MIDWIFE

## 2023-08-08 PROCEDURE — 0502F SUBSEQUENT PRENATAL CARE: CPT | Mod: CPTII,,, | Performed by: ADVANCED PRACTICE MIDWIFE

## 2023-08-08 PROCEDURE — 76816 OB US FOLLOW-UP PER FETUS: CPT | Mod: 26,,, | Performed by: RADIOLOGY

## 2023-08-08 PROCEDURE — 81003 URINALYSIS AUTO W/O SCOPE: CPT | Mod: PBBFAC | Performed by: ADVANCED PRACTICE MIDWIFE

## 2023-08-08 PROCEDURE — 76816 OB US FOLLOW-UP PER FETUS: CPT | Mod: TC

## 2023-08-08 NOTE — PROGRESS NOTES
28 y.o. female  at 31w3d   She denies any problems.   Reports good fetal movement or fluttering. Denies any vaginal bleeding, leakage of fluid, cramping, contractions, or pressure.   Total weight gain/weight loss in pregnancy: 11.8 kg (26 lb)     Vitals  BP: 110/70  Pulse: 89  Weight: 73 kg (161 lb)  Prenatal  Fetal Heart Rate: 150  Movement: Present  Edema  LLE Edema: None  RLE Edema: None  Facial: None  Additional Edema?: No  Cervical Exam  Presentation: Vertex    Prenatal Labs:  Lab Results   Component Value Date    GROUPTRH O POS 2023    HGB 12.2 2023    HCT 37.9 (L) 2023    PLT 74 (L) 2023    HEPBSAG Non-Reactive 2023    [id number] Non-Reactive 2023    LABNGO Negative 2023    LABURIN Skin/Urogenital Meme Isolated, no further workup. 2023       A: 31w3d           ICD-10-CM ICD-9-CM    1. Encounter for supervision of normal first pregnancy in third trimester  Z34.03 V22.0       2. 31 weeks gestation of pregnancy  Z3A.31 V22.2       3. Urinary frequency  R35.0 788.41 POCT URINALYSIS W/O SCOPE        US today EFW: 3lb 13oz EMILY: 11.0cm CL: 3.2cm  P: Bleeding, daily fetal kick counts, and  labor/labor precautions discussed.      Questions answered to desired level of satisfaction  Verbalized understanding to all information and instructions provided.  Follow up in about 2 weeks (around 2023) for OBV.    Nnuu Chandra CNM, DA-BC

## 2023-08-22 ENCOUNTER — ROUTINE PRENATAL (OUTPATIENT)
Dept: OBSTETRICS AND GYNECOLOGY | Facility: CLINIC | Age: 28
End: 2023-08-22
Payer: COMMERCIAL

## 2023-08-22 VITALS
SYSTOLIC BLOOD PRESSURE: 112 MMHG | BODY MASS INDEX: 32.72 KG/M2 | HEART RATE: 80 BPM | DIASTOLIC BLOOD PRESSURE: 70 MMHG | WEIGHT: 162 LBS

## 2023-08-22 DIAGNOSIS — Z34.03 ENCOUNTER FOR SUPERVISION OF NORMAL FIRST PREGNANCY IN THIRD TRIMESTER: Primary | ICD-10-CM

## 2023-08-22 DIAGNOSIS — Z3A.33 33 WEEKS GESTATION OF PREGNANCY: ICD-10-CM

## 2023-08-22 DIAGNOSIS — R35.0 URINARY FREQUENCY: ICD-10-CM

## 2023-08-22 LAB
BILIRUB SERPL-MCNC: NORMAL MG/DL
BLOOD URINE, POC: NORMAL
COLOR, POC UA: YELLOW
GLUCOSE UR QL STRIP: NORMAL
KETONES UR QL STRIP: NORMAL
LEUKOCYTE ESTERASE URINE, POC: NORMAL
NITRITE, POC UA: NORMAL
PH, POC UA: 7
PROTEIN, POC: NORMAL
SPECIFIC GRAVITY, POC UA: 1.02
UROBILINOGEN, POC UA: NORMAL

## 2023-08-22 PROCEDURE — 99999PBSHW POCT URINALYSIS W/O SCOPE: Mod: PBBFAC,,,

## 2023-08-22 PROCEDURE — 0502F PR SUBSEQUENT PRENATAL CARE: ICD-10-PCS | Mod: CPTII,,, | Performed by: ADVANCED PRACTICE MIDWIFE

## 2023-08-22 PROCEDURE — 99213 OFFICE O/P EST LOW 20 MIN: CPT | Mod: PBBFAC | Performed by: ADVANCED PRACTICE MIDWIFE

## 2023-08-22 PROCEDURE — 0502F SUBSEQUENT PRENATAL CARE: CPT | Mod: CPTII,,, | Performed by: ADVANCED PRACTICE MIDWIFE

## 2023-08-22 PROCEDURE — 99999PBSHW POCT URINALYSIS W/O SCOPE: ICD-10-PCS | Mod: PBBFAC,,,

## 2023-08-22 PROCEDURE — 81003 URINALYSIS AUTO W/O SCOPE: CPT | Mod: PBBFAC | Performed by: ADVANCED PRACTICE MIDWIFE

## 2023-08-22 NOTE — PROGRESS NOTES
28 y.o. female  at 33w3d   She c/o possible contraction.  Recommended hydration & rest if she has any consistent cramping, and when to go to L&D.  Reports good fetal movement or fluttering. Denies any vaginal bleeding, leakage of fluid, cramping, contractions, or pressure.   Total weight gain/weight loss in pregnancy: 12.2 kg (27 lb)     Vitals  BP: 112/70  Pulse: 80  Weight: 73.5 kg (162 lb)  Prenatal  Fetal Heart Rate: 150  Movement: Present  Edema  LLE Edema: Mild pitting, slight indentation  RLE Edema: Mild pitting, slight indentation  Facial: None  Additional Edema?: No    Prenatal Labs:  Lab Results   Component Value Date    GROUPTRH O POS 2023    HGB 12.2 2023    HCT 37.9 (L) 2023    PLT 74 (L) 2023    HEPBSAG Non-Reactive 2023    [id number] Non-Reactive 2023    LABNGO Negative 2023    LABURIN Skin/Urogenital Meme Isolated, no further workup. 2023       A: 33w3d           ICD-10-CM ICD-9-CM    1. Encounter for supervision of normal first pregnancy in third trimester  Z34.03 V22.0 US OB Follow-up Ea Gestation Transabd      2. 33 weeks gestation of pregnancy  Z3A.33 V22.2       3. Urinary frequency  R35.0 788.41 POCT URINALYSIS W/O SCOPE          P: Bleeding, daily fetal kick counts, and  labor/labor precautions discussed.      Questions answered to desired level of satisfaction  Verbalized understanding to all information and instructions provided.  Follow up in about 2 weeks (around 2023) for OBV, US.    Nunu Chandra CNM, DA-BC

## 2023-09-05 ENCOUNTER — HOSPITAL ENCOUNTER (OUTPATIENT)
Dept: RADIOLOGY | Facility: HOSPITAL | Age: 28
Discharge: HOME | End: 2023-09-05
Attending: ADVANCED PRACTICE MIDWIFE
Payer: COMMERCIAL

## 2023-09-05 ENCOUNTER — ROUTINE PRENATAL (OUTPATIENT)
Dept: OBSTETRICS AND GYNECOLOGY | Facility: CLINIC | Age: 28
End: 2023-09-05
Payer: COMMERCIAL

## 2023-09-05 VITALS
SYSTOLIC BLOOD PRESSURE: 110 MMHG | WEIGHT: 164 LBS | HEART RATE: 81 BPM | DIASTOLIC BLOOD PRESSURE: 70 MMHG | BODY MASS INDEX: 33.12 KG/M2

## 2023-09-05 DIAGNOSIS — R35.0 URINARY FREQUENCY: ICD-10-CM

## 2023-09-05 DIAGNOSIS — Z3A.35 35 WEEKS GESTATION OF PREGNANCY: ICD-10-CM

## 2023-09-05 DIAGNOSIS — Z34.03 ENCOUNTER FOR SUPERVISION OF NORMAL FIRST PREGNANCY IN THIRD TRIMESTER: ICD-10-CM

## 2023-09-05 DIAGNOSIS — Z34.03 ENCOUNTER FOR SUPERVISION OF NORMAL FIRST PREGNANCY IN THIRD TRIMESTER: Primary | ICD-10-CM

## 2023-09-05 LAB
BILIRUB SERPL-MCNC: NORMAL MG/DL
BLOOD URINE, POC: NORMAL
COLOR, POC UA: YELLOW
GLUCOSE UR QL STRIP: NORMAL
KETONES UR QL STRIP: NORMAL
LEUKOCYTE ESTERASE URINE, POC: NORMAL
NITRITE, POC UA: NORMAL
PH, POC UA: 5
PROTEIN, POC: NORMAL
SPECIFIC GRAVITY, POC UA: 1.01
UROBILINOGEN, POC UA: NORMAL

## 2023-09-05 PROCEDURE — 76816 OB US FOLLOW-UP PER FETUS: CPT | Mod: 26,,, | Performed by: STUDENT IN AN ORGANIZED HEALTH CARE EDUCATION/TRAINING PROGRAM

## 2023-09-05 PROCEDURE — 0502F PR SUBSEQUENT PRENATAL CARE: ICD-10-PCS | Mod: CPTII,,, | Performed by: ADVANCED PRACTICE MIDWIFE

## 2023-09-05 PROCEDURE — 0502F SUBSEQUENT PRENATAL CARE: CPT | Mod: CPTII,,, | Performed by: ADVANCED PRACTICE MIDWIFE

## 2023-09-05 PROCEDURE — 76816 US OB FOLLOW UP EA GESTATION TRANSABDOMINAL: ICD-10-PCS | Mod: 26,,, | Performed by: STUDENT IN AN ORGANIZED HEALTH CARE EDUCATION/TRAINING PROGRAM

## 2023-09-05 PROCEDURE — 81003 URINALYSIS AUTO W/O SCOPE: CPT | Mod: PBBFAC | Performed by: ADVANCED PRACTICE MIDWIFE

## 2023-09-05 PROCEDURE — 76816 OB US FOLLOW-UP PER FETUS: CPT | Mod: TC

## 2023-09-05 PROCEDURE — 99213 OFFICE O/P EST LOW 20 MIN: CPT | Mod: PBBFAC | Performed by: ADVANCED PRACTICE MIDWIFE

## 2023-09-05 PROCEDURE — 99999PBSHW POCT URINALYSIS W/O SCOPE: Mod: PBBFAC,,,

## 2023-09-05 PROCEDURE — 99999PBSHW POCT URINALYSIS W/O SCOPE: ICD-10-PCS | Mod: PBBFAC,,,

## 2023-09-05 NOTE — PROGRESS NOTES
28 y.o. female  at 35w3d   She denies complaints  Reports good fetal movement or fluttering. Denies any vaginal bleeding, leakage of fluid, cramping, contractions, or pressure.   Total weight gain/weight loss in pregnancy: 13.2 kg (29 lb)     Vitals  BP: 110/70  Pulse: 81  Weight: 74.4 kg (164 lb)  Prenatal  Fetal Heart Rate: 148  Movement: Present  Edema  LLE Edema: None  RLE Edema: None  Facial: None  Additional Edema?: No    Prenatal Labs:  Lab Results   Component Value Date    GROUPTRH O POS 2023    HGB 12.2 2023    HCT 37.9 (L) 2023    PLT 74 (L) 2023    HEPBSAG Non-Reactive 2023    [id number] Non-Reactive 2023    LABNGO Negative 2023    LABURIN Skin/Urogenital Meme Isolated, no further workup. 2023       A: 35w3d           ICD-10-CM ICD-9-CM    1. Encounter for supervision of normal first pregnancy in third trimester  Z34.03 V22.0       2. 35 weeks gestation of pregnancy  Z3A.35 V22.2       3. Urinary frequency  R35.0 788.41 POCT URINALYSIS W/O SCOPE        US today.  EFW 5#8oz, EMILY 8.83cm, vertex position, anterior placenta, CL 3.13cm    P: Bleeding, daily fetal kick counts, and  labor/labor precautions discussed.      Questions answered to desired level of satisfaction  Verbalized understanding to all information and instructions provided.  Follow up in about 1 week (around 2023) for OBV, GBS.    Nunu Chandra CNM, DA-BC

## 2023-09-11 ENCOUNTER — ROUTINE PRENATAL (OUTPATIENT)
Dept: OBSTETRICS AND GYNECOLOGY | Facility: CLINIC | Age: 28
End: 2023-09-11
Payer: COMMERCIAL

## 2023-09-11 VITALS
HEART RATE: 78 BPM | BODY MASS INDEX: 33.33 KG/M2 | SYSTOLIC BLOOD PRESSURE: 110 MMHG | DIASTOLIC BLOOD PRESSURE: 72 MMHG | WEIGHT: 165 LBS

## 2023-09-11 DIAGNOSIS — Z3A.36 36 WEEKS GESTATION OF PREGNANCY: ICD-10-CM

## 2023-09-11 DIAGNOSIS — R35.0 URINARY FREQUENCY: ICD-10-CM

## 2023-09-11 DIAGNOSIS — Z34.03 ENCOUNTER FOR SUPERVISION OF NORMAL FIRST PREGNANCY IN THIRD TRIMESTER: Primary | ICD-10-CM

## 2023-09-11 LAB
BILIRUB UR QL STRIP: NEGATIVE
CLARITY UR: CLEAR
COLOR UR: ABNORMAL
GLUCOSE UR STRIP-MCNC: NORMAL MG/DL
KETONES UR STRIP-SCNC: NEGATIVE MG/DL
LEUKOCYTE ESTERASE UR QL STRIP: NEGATIVE
MUCOUS, UA: ABNORMAL /LPF
NITRITE UR QL STRIP: NEGATIVE
PH UR STRIP: 6 PH UNITS
PROT UR QL STRIP: 20
RBC # UR STRIP: NEGATIVE /UL
RBC #/AREA URNS HPF: 1 /HPF
SP GR UR STRIP: 1.02
SQUAMOUS #/AREA URNS LPF: ABNORMAL /HPF
UROBILINOGEN UR STRIP-ACNC: NORMAL MG/DL
WBC #/AREA URNS HPF: 2 /HPF

## 2023-09-11 PROCEDURE — 81001 URINALYSIS, REFLEX TO URINE CULTURE: ICD-10-PCS | Mod: ,,, | Performed by: CLINICAL MEDICAL LABORATORY

## 2023-09-11 PROCEDURE — 99213 OFFICE O/P EST LOW 20 MIN: CPT | Mod: PBBFAC | Performed by: ADVANCED PRACTICE MIDWIFE

## 2023-09-11 PROCEDURE — 0502F PR SUBSEQUENT PRENATAL CARE: ICD-10-PCS | Mod: CPTII,,, | Performed by: ADVANCED PRACTICE MIDWIFE

## 2023-09-11 PROCEDURE — 87653 CULTURE, GROUP B STREP: ICD-10-PCS | Mod: ,,, | Performed by: CLINICAL MEDICAL LABORATORY

## 2023-09-11 PROCEDURE — 81001 URINALYSIS AUTO W/SCOPE: CPT | Mod: ,,, | Performed by: CLINICAL MEDICAL LABORATORY

## 2023-09-11 PROCEDURE — 0502F SUBSEQUENT PRENATAL CARE: CPT | Mod: CPTII,,, | Performed by: ADVANCED PRACTICE MIDWIFE

## 2023-09-11 PROCEDURE — 87653 STREP B DNA AMP PROBE: CPT | Mod: ,,, | Performed by: CLINICAL MEDICAL LABORATORY

## 2023-09-11 NOTE — PROGRESS NOTES
28 y.o. female  at 36w2d   She c/o irregular contractions.  GBS collected.   Reports good fetal movement or fluttering. Denies any vaginal bleeding, leakage of fluid, cramping, contractions, or pressure.   Total weight gain/weight loss in pregnancy: 13.6 kg (30 lb)     Vitals  BP: 110/72  Pulse: 78  Weight: 74.8 kg (165 lb)  Prenatal  Movement: Present  Edema  LLE Edema: Mild pitting, slight indentation  RLE Edema: Mild pitting, slight indentation  Facial: None  Additional Edema?: No    Prenatal Labs:  Lab Results   Component Value Date    GROUPTRH O POS 2023    HGB 12.2 2023    HCT 37.9 (L) 2023    PLT 74 (L) 2023    HEPBSAG Non-Reactive 2023    [id number] Non-Reactive 2023    LABNGO Negative 2023    LABURIN Skin/Urogenital Meme Isolated, no further workup. 2023       A: 36w2d           ICD-10-CM ICD-9-CM    1. Encounter for supervision of normal first pregnancy in third trimester  Z34.03 V22.0 Culture, Group B Strep      Urinalysis, Reflex to Urine Culture      2. 36 weeks gestation of pregnancy  Z3A.36 V22.2 Culture, Group B Strep      Urinalysis, Reflex to Urine Culture      3. Urinary frequency  R35.0 788.41 Culture, Group B Strep      Urinalysis, Reflex to Urine Culture          P: Bleeding, daily fetal kick counts, and  labor/labor precautions discussed.      Questions answered to desired level of satisfaction  Verbalized understanding to all information and instructions provided.  Follow up in about 1 week (around 2023) for OBV.    Nunu Chandra CNM, DA-BC

## 2023-09-16 LAB — CULTURE, GROUP B STREP: NORMAL

## 2023-09-18 ENCOUNTER — ROUTINE PRENATAL (OUTPATIENT)
Dept: OBSTETRICS AND GYNECOLOGY | Facility: CLINIC | Age: 28
End: 2023-09-18
Payer: COMMERCIAL

## 2023-09-18 VITALS
WEIGHT: 168 LBS | HEART RATE: 72 BPM | SYSTOLIC BLOOD PRESSURE: 118 MMHG | DIASTOLIC BLOOD PRESSURE: 70 MMHG | BODY MASS INDEX: 33.93 KG/M2

## 2023-09-18 DIAGNOSIS — Z34.03 ENCOUNTER FOR SUPERVISION OF NORMAL FIRST PREGNANCY IN THIRD TRIMESTER: Primary | ICD-10-CM

## 2023-09-18 DIAGNOSIS — Z3A.37 37 WEEKS GESTATION OF PREGNANCY: ICD-10-CM

## 2023-09-18 DIAGNOSIS — R35.0 URINARY FREQUENCY: ICD-10-CM

## 2023-09-18 LAB
BILIRUB UR QL STRIP: NEGATIVE
CLARITY UR: CLEAR
COLOR UR: ABNORMAL
GLUCOSE UR STRIP-MCNC: NORMAL MG/DL
KETONES UR STRIP-SCNC: NEGATIVE MG/DL
LEUKOCYTE ESTERASE UR QL STRIP: NEGATIVE
MUCOUS, UA: ABNORMAL /LPF
NITRITE UR QL STRIP: NEGATIVE
PH UR STRIP: 7 PH UNITS
PROT UR QL STRIP: 10
RBC # UR STRIP: NEGATIVE /UL
RBC #/AREA URNS HPF: <1 /HPF
SP GR UR STRIP: 1.02
SQUAMOUS #/AREA URNS LPF: ABNORMAL /HPF
UROBILINOGEN UR STRIP-ACNC: NORMAL MG/DL
WBC #/AREA URNS HPF: 1 /HPF

## 2023-09-18 PROCEDURE — 81001 URINALYSIS, REFLEX TO URINE CULTURE: ICD-10-PCS | Mod: ,,, | Performed by: CLINICAL MEDICAL LABORATORY

## 2023-09-18 PROCEDURE — 0502F SUBSEQUENT PRENATAL CARE: CPT | Mod: CPTII,,, | Performed by: ADVANCED PRACTICE MIDWIFE

## 2023-09-18 PROCEDURE — 99213 OFFICE O/P EST LOW 20 MIN: CPT | Mod: PBBFAC | Performed by: ADVANCED PRACTICE MIDWIFE

## 2023-09-18 PROCEDURE — 81001 URINALYSIS AUTO W/SCOPE: CPT | Mod: ,,, | Performed by: CLINICAL MEDICAL LABORATORY

## 2023-09-18 PROCEDURE — 0502F PR SUBSEQUENT PRENATAL CARE: ICD-10-PCS | Mod: CPTII,,, | Performed by: ADVANCED PRACTICE MIDWIFE

## 2023-09-18 NOTE — PROGRESS NOTES
28 y.o. female  at 37w2d   She c/o pressure and possible diamond blair.   Reports good fetal movement or fluttering. Denies any vaginal bleeding, leakage of fluid, cramping, contractions, or pressure.   Total weight gain/weight loss in pregnancy: 15 kg (33 lb)     Vitals  BP: 118/70  Pulse: 72  Weight: 76.2 kg (168 lb)  Prenatal  Fetal Heart Rate: 140  Movement: Present  Edema  LLE Edema: Mild pitting, slight indentation  RLE Edema: Mild pitting, slight indentation  Facial: None  Additional Edema?: No  Cervical Exam  Dilation: Closed  Effacement (%): 50  Station: -3  Presentation: Vertex  Station (Labor Curve): 8 cm  Dilation/Effacement/Station  Dilation: Closed  Effacement (%): 50  Station: -3    Prenatal Labs:  Lab Results   Component Value Date    GROUPTRH O POS 2023    HGB 12.2 2023    HCT 37.9 (L) 2023    PLT 74 (L) 2023    HEPBSAG Non-Reactive 2023    [id number] Non-Reactive 2023    LABNGO Negative 2023    LABURIN Skin/Urogenital Meme Isolated, no further workup. 2023       A: 37w2d           ICD-10-CM ICD-9-CM    1. Encounter for supervision of normal first pregnancy in third trimester  Z34.03 V22.0 Urinalysis, Reflex to Urine Culture      2. 37 weeks gestation of pregnancy  Z3A.37 V22.2 Urinalysis, Reflex to Urine Culture      3. Urinary frequency  R35.0 788.41 Urinalysis, Reflex to Urine Culture          P: Bleeding, daily fetal kick counts, and  labor/labor precautions discussed.      Questions answered to desired level of satisfaction  Verbalized understanding to all information and instructions provided.  Follow up in about 1 week (around 2023) for OBV.    Nunu Chandra CNM, DA-BC

## 2023-09-25 ENCOUNTER — ROUTINE PRENATAL (OUTPATIENT)
Dept: OBSTETRICS AND GYNECOLOGY | Facility: CLINIC | Age: 28
End: 2023-09-25
Payer: COMMERCIAL

## 2023-09-25 VITALS
BODY MASS INDEX: 34.34 KG/M2 | WEIGHT: 170 LBS | SYSTOLIC BLOOD PRESSURE: 110 MMHG | DIASTOLIC BLOOD PRESSURE: 70 MMHG | HEART RATE: 75 BPM

## 2023-09-25 DIAGNOSIS — R35.0 URINARY FREQUENCY: ICD-10-CM

## 2023-09-25 DIAGNOSIS — Z34.03 ENCOUNTER FOR SUPERVISION OF NORMAL FIRST PREGNANCY IN THIRD TRIMESTER: Primary | ICD-10-CM

## 2023-09-25 DIAGNOSIS — Z3A.38 38 WEEKS GESTATION OF PREGNANCY: ICD-10-CM

## 2023-09-25 LAB
BILIRUB UR QL STRIP: NEGATIVE
CLARITY UR: CLEAR
COLOR UR: ABNORMAL
GLUCOSE UR STRIP-MCNC: NORMAL MG/DL
KETONES UR STRIP-SCNC: NEGATIVE MG/DL
LEUKOCYTE ESTERASE UR QL STRIP: NEGATIVE
MUCOUS, UA: ABNORMAL /LPF
NITRITE UR QL STRIP: NEGATIVE
PH UR STRIP: 7 PH UNITS
PROT UR QL STRIP: 20
RBC # UR STRIP: NEGATIVE /UL
RBC #/AREA URNS HPF: 1 /HPF
SP GR UR STRIP: 1.02
SQUAMOUS #/AREA URNS LPF: ABNORMAL /HPF
UROBILINOGEN UR STRIP-ACNC: 2 MG/DL
WBC #/AREA URNS HPF: 1 /HPF

## 2023-09-25 PROCEDURE — 99213 OFFICE O/P EST LOW 20 MIN: CPT | Mod: PBBFAC | Performed by: ADVANCED PRACTICE MIDWIFE

## 2023-09-25 PROCEDURE — 81001 URINALYSIS AUTO W/SCOPE: CPT | Mod: ,,, | Performed by: CLINICAL MEDICAL LABORATORY

## 2023-09-25 PROCEDURE — 81001 URINALYSIS, REFLEX TO URINE CULTURE: ICD-10-PCS | Mod: ,,, | Performed by: CLINICAL MEDICAL LABORATORY

## 2023-09-25 PROCEDURE — 0502F SUBSEQUENT PRENATAL CARE: CPT | Mod: CPTII,,, | Performed by: ADVANCED PRACTICE MIDWIFE

## 2023-09-25 PROCEDURE — 0502F PR SUBSEQUENT PRENATAL CARE: ICD-10-PCS | Mod: CPTII,,, | Performed by: ADVANCED PRACTICE MIDWIFE

## 2023-09-25 NOTE — PROGRESS NOTES
28 y.o. female  at 38w2d   She c/o irregular contractions and pressure. C/o occasional nausea.   Reports good fetal movement or fluttering. Denies any vaginal bleeding, leakage of fluid, cramping, contractions, or pressure.   Total weight gain/weight loss in pregnancy: 15.9 kg (35 lb)     Vitals  BP: 110/70  Pulse: 75  Weight: 77.1 kg (170 lb)  Prenatal  Fetal Heart Rate: 150  Movement: Present  Edema  LLE Edema: Mild pitting, slight indentation  RLE Edema: Mild pitting, slight indentation  Facial: None  Additional Edema?: No  Cervical Exam  Dilation: Closed  Effacement (%): 50  Station: -3  Presentation: Vertex  Station (Labor Curve): 8 cm  Dilation/Effacement/Station  Dilation: Closed  Effacement (%): 50  Station: -3    Prenatal Labs:  Lab Results   Component Value Date    GROUPTRH O POS 2023    HGB 12.2 2023    HCT 37.9 (L) 2023    PLT 74 (L) 2023    HEPBSAG Non-Reactive 2023    [id number] Non-Reactive 2023    LABNGO Negative 2023    LABURIN Skin/Urogenital Meme Isolated, no further workup. 2023       A: 38w2d           ICD-10-CM ICD-9-CM    1. Encounter for supervision of normal first pregnancy in third trimester  Z34.03 V22.0 Urinalysis, Reflex to Urine Culture      2. 38 weeks gestation of pregnancy  Z3A.38 V22.2 Urinalysis, Reflex to Urine Culture      3. Urinary frequency  R35.0 788.41 Urinalysis, Reflex to Urine Culture          P: Bleeding, daily fetal kick counts, and  labor/labor precautions discussed.      Questions answered to desired level of satisfaction  Verbalized understanding to all information and instructions provided.  Follow up in about 1 week (around 10/2/2023) for OBV.    Nunu Chandra CNM, DA-BC

## 2023-10-02 ENCOUNTER — ROUTINE PRENATAL (OUTPATIENT)
Dept: OBSTETRICS AND GYNECOLOGY | Facility: CLINIC | Age: 28
End: 2023-10-02
Payer: COMMERCIAL

## 2023-10-02 ENCOUNTER — HOSPITAL ENCOUNTER (OUTPATIENT)
Dept: RADIOLOGY | Facility: HOSPITAL | Age: 28
Discharge: HOME | End: 2023-10-02
Attending: ADVANCED PRACTICE MIDWIFE
Payer: COMMERCIAL

## 2023-10-02 VITALS
HEART RATE: 70 BPM | DIASTOLIC BLOOD PRESSURE: 70 MMHG | SYSTOLIC BLOOD PRESSURE: 110 MMHG | WEIGHT: 170 LBS | BODY MASS INDEX: 34.34 KG/M2

## 2023-10-02 DIAGNOSIS — Z34.90 ENCOUNTER FOR INDUCTION OF LABOR: ICD-10-CM

## 2023-10-02 DIAGNOSIS — Z34.03 ENCOUNTER FOR SUPERVISION OF NORMAL FIRST PREGNANCY IN THIRD TRIMESTER: Primary | ICD-10-CM

## 2023-10-02 DIAGNOSIS — Z3A.39 39 WEEKS GESTATION OF PREGNANCY: ICD-10-CM

## 2023-10-02 DIAGNOSIS — R35.0 URINARY FREQUENCY: ICD-10-CM

## 2023-10-02 PROCEDURE — 76816 OB US FOLLOW-UP PER FETUS: CPT | Mod: TC

## 2023-10-02 PROCEDURE — 0502F PR SUBSEQUENT PRENATAL CARE: ICD-10-PCS | Mod: CPTII,,, | Performed by: ADVANCED PRACTICE MIDWIFE

## 2023-10-02 PROCEDURE — 0502F SUBSEQUENT PRENATAL CARE: CPT | Mod: CPTII,,, | Performed by: ADVANCED PRACTICE MIDWIFE

## 2023-10-02 PROCEDURE — 76816 OB US FOLLOW-UP PER FETUS: CPT | Mod: 26,,, | Performed by: STUDENT IN AN ORGANIZED HEALTH CARE EDUCATION/TRAINING PROGRAM

## 2023-10-02 PROCEDURE — 99213 OFFICE O/P EST LOW 20 MIN: CPT | Mod: PBBFAC | Performed by: ADVANCED PRACTICE MIDWIFE

## 2023-10-02 PROCEDURE — 76816 US OB FOLLOW UP EA GESTATION TRANSABDOMINAL: ICD-10-PCS | Mod: 26,,, | Performed by: STUDENT IN AN ORGANIZED HEALTH CARE EDUCATION/TRAINING PROGRAM

## 2023-10-02 PROCEDURE — 99999PBSHW POCT URINALYSIS W/O SCOPE: ICD-10-PCS | Mod: PBBFAC,,,

## 2023-10-02 PROCEDURE — 81003 URINALYSIS AUTO W/O SCOPE: CPT | Mod: PBBFAC | Performed by: ADVANCED PRACTICE MIDWIFE

## 2023-10-02 PROCEDURE — 99999PBSHW POCT URINALYSIS W/O SCOPE: Mod: PBBFAC,,,

## 2023-10-02 RX ORDER — PROCHLORPERAZINE EDISYLATE 5 MG/ML
5 INJECTION INTRAMUSCULAR; INTRAVENOUS EVERY 6 HOURS PRN
Status: CANCELLED | OUTPATIENT
Start: 2023-10-02

## 2023-10-02 RX ORDER — LIDOCAINE HYDROCHLORIDE 10 MG/ML
10 INJECTION INFILTRATION; PERINEURAL ONCE AS NEEDED
Status: CANCELLED | OUTPATIENT
Start: 2023-10-02 | End: 2035-02-28

## 2023-10-02 RX ORDER — TERBUTALINE SULFATE 1 MG/ML
0.25 INJECTION SUBCUTANEOUS
Status: CANCELLED | OUTPATIENT
Start: 2023-10-02

## 2023-10-02 RX ORDER — CALCIUM CARBONATE 200(500)MG
500 TABLET,CHEWABLE ORAL 3 TIMES DAILY PRN
Status: CANCELLED | OUTPATIENT
Start: 2023-10-02

## 2023-10-02 RX ORDER — CARBOPROST TROMETHAMINE 250 UG/ML
250 INJECTION, SOLUTION INTRAMUSCULAR
Status: CANCELLED | OUTPATIENT
Start: 2023-10-02

## 2023-10-02 RX ORDER — MISOPROSTOL 100 UG/1
800 TABLET ORAL ONCE AS NEEDED
Status: CANCELLED | OUTPATIENT
Start: 2023-10-02

## 2023-10-02 RX ORDER — ONDANSETRON 4 MG/1
8 TABLET, ORALLY DISINTEGRATING ORAL EVERY 8 HOURS PRN
Status: CANCELLED | OUTPATIENT
Start: 2023-10-02

## 2023-10-02 RX ORDER — DIPHENOXYLATE HYDROCHLORIDE AND ATROPINE SULFATE 2.5; .025 MG/1; MG/1
2 TABLET ORAL EVERY 6 HOURS PRN
Status: CANCELLED | OUTPATIENT
Start: 2023-10-02

## 2023-10-02 RX ORDER — OXYTOCIN/RINGER'S LACTATE 30/500 ML
95 PLASTIC BAG, INJECTION (ML) INTRAVENOUS ONCE AS NEEDED
Status: CANCELLED | OUTPATIENT
Start: 2023-10-02 | End: 2035-02-28

## 2023-10-02 RX ORDER — SIMETHICONE 80 MG
1 TABLET,CHEWABLE ORAL 4 TIMES DAILY PRN
Status: CANCELLED | OUTPATIENT
Start: 2023-10-02

## 2023-10-02 RX ORDER — MISOPROSTOL 100 MCG
25 TABLET ORAL EVERY 4 HOURS
Status: CANCELLED | OUTPATIENT
Start: 2023-10-03 | End: 2023-10-04

## 2023-10-02 RX ORDER — SODIUM CHLORIDE 9 MG/ML
INJECTION, SOLUTION INTRAVENOUS
Status: CANCELLED | OUTPATIENT
Start: 2023-10-02

## 2023-10-02 RX ORDER — OXYTOCIN/RINGER'S LACTATE 30/500 ML
334 PLASTIC BAG, INJECTION (ML) INTRAVENOUS ONCE
Status: CANCELLED | OUTPATIENT
Start: 2023-10-02 | End: 2023-10-02

## 2023-10-02 RX ORDER — OXYTOCIN/RINGER'S LACTATE 30/500 ML
95 PLASTIC BAG, INJECTION (ML) INTRAVENOUS ONCE
Status: CANCELLED | OUTPATIENT
Start: 2023-10-02 | End: 2023-10-02

## 2023-10-02 RX ORDER — MISOPROSTOL 100 UG/1
800 TABLET ORAL ONCE AS NEEDED
Status: CANCELLED | OUTPATIENT
Start: 2023-10-02 | End: 2035-02-28

## 2023-10-02 RX ORDER — OXYTOCIN/RINGER'S LACTATE 30/500 ML
334 PLASTIC BAG, INJECTION (ML) INTRAVENOUS ONCE AS NEEDED
Status: CANCELLED | OUTPATIENT
Start: 2023-10-02 | End: 2035-02-28

## 2023-10-02 RX ORDER — OXYTOCIN 10 [USP'U]/ML
10 INJECTION, SOLUTION INTRAMUSCULAR; INTRAVENOUS ONCE AS NEEDED
Status: CANCELLED | OUTPATIENT
Start: 2023-10-02 | End: 2035-02-28

## 2023-10-02 RX ORDER — SODIUM CHLORIDE, SODIUM LACTATE, POTASSIUM CHLORIDE, CALCIUM CHLORIDE 600; 310; 30; 20 MG/100ML; MG/100ML; MG/100ML; MG/100ML
INJECTION, SOLUTION INTRAVENOUS CONTINUOUS
Status: CANCELLED | OUTPATIENT
Start: 2023-10-02

## 2023-10-02 RX ORDER — METHYLERGONOVINE MALEATE 0.2 MG/ML
200 INJECTION INTRAVENOUS ONCE AS NEEDED
Status: CANCELLED | OUTPATIENT
Start: 2023-10-02 | End: 2035-02-28

## 2023-10-02 NOTE — PROGRESS NOTES
28 y.o. female  at 39w2d   She denies complaints.  Pt doesn't feel like she is going to be able to have a vaginal delivery and is considering Elective PCS.  Discussed R/B/A including infection, bleeding, damage to adjacent organs. Discussed longer recovery with  delivery and need for RCS with future pregnancies.  Discussed cervical ripening with Cytotec and pt is open to this.  EFW today 7#1oz, EMILY 8.6cm, vertex position.  Pt wishes to proceed with cervical ripening.     Reports good fetal movement or fluttering. Denies any vaginal bleeding, leakage of fluid, cramping, contractions, or pressure.   Total weight gain/weight loss in pregnancy: 15.9 kg (35 lb)     Vitals  BP: 110/70  Pulse: 70  Weight: 77.1 kg (170 lb)  Prenatal  Fetal Heart Rate: 145  Movement: Present  Edema  LLE Edema: Mild pitting, slight indentation  RLE Edema: Mild pitting, slight indentation  Facial: None  Additional Edema?: No  Cervical Exam  Dilation: Closed  Effacement (%): 50  Station: -3  Presentation: Vertex  Station (Labor Curve): 8 cm  Dilation/Effacement/Station  Dilation: Closed  Effacement (%): 50  Station: -3    Prenatal Labs:  Lab Results   Component Value Date    GROUPTRH O POS 2023    HGB 12.2 2023    HCT 37.9 (L) 2023    PLT 74 (L) 2023    HEPBSAG Non-Reactive 2023    [id number] Non-Reactive 2023    LABNGO Negative 2023    LABURIN Skin/Urogenital Meme Isolated, no further workup. 2023       A: 39w2d           ICD-10-CM ICD-9-CM    1. Encounter for supervision of normal first pregnancy in third trimester  Z34.03 V22.0       2. 39 weeks gestation of pregnancy  Z3A.39 V22.2       3. Urinary frequency  R35.0 788.41 POCT URINALYSIS W/O SCOPE          P: Bleeding, daily fetal kick counts, and  labor/labor precautions discussed.      Questions answered to desired level of satisfaction  Verbalized understanding to all information and instructions provided.  IOL 10/3/23  at 8pm    Nunu Chandra CNM, NP-BC

## 2023-10-03 ENCOUNTER — HOSPITAL ENCOUNTER (INPATIENT)
Facility: HOSPITAL | Age: 28
LOS: 3 days | Discharge: HOME | End: 2023-10-06
Attending: STUDENT IN AN ORGANIZED HEALTH CARE EDUCATION/TRAINING PROGRAM | Admitting: STUDENT IN AN ORGANIZED HEALTH CARE EDUCATION/TRAINING PROGRAM
Payer: COMMERCIAL

## 2023-10-03 DIAGNOSIS — Z34.90 ENCOUNTER FOR INDUCTION OF LABOR: ICD-10-CM

## 2023-10-03 DIAGNOSIS — Z3A.39 39 WEEKS GESTATION OF PREGNANCY: ICD-10-CM

## 2023-10-03 DIAGNOSIS — Z34.03 ENCOUNTER FOR SUPERVISION OF NORMAL FIRST PREGNANCY IN THIRD TRIMESTER: ICD-10-CM

## 2023-10-03 DIAGNOSIS — D69.3 IDIOPATHIC THROMBOCYTOPENIC PURPURA (ITP): ICD-10-CM

## 2023-10-03 LAB
ALBUMIN SERPL BCP-MCNC: 2.4 G/DL (ref 3.5–5)
ALBUMIN/GLOB SERPL: 0.8 {RATIO}
ALP SERPL-CCNC: 183 U/L (ref 37–98)
ALT SERPL W P-5'-P-CCNC: 22 U/L (ref 13–56)
ANION GAP SERPL CALCULATED.3IONS-SCNC: 15 MMOL/L (ref 7–16)
AST SERPL W P-5'-P-CCNC: 20 U/L (ref 15–37)
BASOPHILS # BLD AUTO: 0.03 K/UL (ref 0–0.2)
BASOPHILS NFR BLD AUTO: 0.3 % (ref 0–1)
BILIRUB SERPL-MCNC: 0.9 MG/DL (ref ?–1.2)
BILIRUB UR QL STRIP: NEGATIVE
BUN SERPL-MCNC: 9 MG/DL (ref 7–18)
BUN/CREAT SERPL: 15 (ref 6–20)
CALCIUM SERPL-MCNC: 8.2 MG/DL (ref 8.5–10.1)
CHLORIDE SERPL-SCNC: 105 MMOL/L (ref 98–107)
CLARITY UR: CLEAR
CO2 SERPL-SCNC: 21 MMOL/L (ref 21–32)
COLOR UR: NORMAL
CREAT SERPL-MCNC: 0.61 MG/DL (ref 0.55–1.02)
DIFFERENTIAL METHOD BLD: ABNORMAL
EGFR (NO RACE VARIABLE) (RUSH/TITUS): 127 ML/MIN/1.73M2
EOSINOPHIL # BLD AUTO: 0.07 K/UL (ref 0–0.5)
EOSINOPHIL NFR BLD AUTO: 0.8 % (ref 1–4)
ERYTHROCYTE [DISTWIDTH] IN BLOOD BY AUTOMATED COUNT: 14.3 % (ref 11.5–14.5)
GLOBULIN SER-MCNC: 3.2 G/DL (ref 2–4)
GLUCOSE SERPL-MCNC: 121 MG/DL (ref 74–106)
GLUCOSE UR STRIP-MCNC: NORMAL MG/DL
HBV SURFACE AG SERPL QL IA: NORMAL
HCT VFR BLD AUTO: 34.5 % (ref 38–47)
HGB BLD-MCNC: 11.1 G/DL (ref 12–16)
HIV 1+O+2 AB SERPL QL: NORMAL
IMM GRANULOCYTES # BLD AUTO: 0.07 K/UL (ref 0–0.04)
IMM GRANULOCYTES NFR BLD: 0.8 % (ref 0–0.4)
INDIRECT COOMBS: NORMAL
KETONES UR STRIP-SCNC: NEGATIVE MG/DL
LEUKOCYTE ESTERASE UR QL STRIP: NEGATIVE
LYMPHOCYTES # BLD AUTO: 2.07 K/UL (ref 1–4.8)
LYMPHOCYTES NFR BLD AUTO: 22.3 % (ref 27–41)
MCH RBC QN AUTO: 27.2 PG (ref 27–31)
MCHC RBC AUTO-ENTMCNC: 32.2 G/DL (ref 32–36)
MCV RBC AUTO: 84.6 FL (ref 80–96)
MONOCYTES # BLD AUTO: 0.7 K/UL (ref 0–0.8)
MONOCYTES NFR BLD AUTO: 7.5 % (ref 2–6)
MPC BLD CALC-MCNC: 12.2 FL (ref 9.4–12.4)
NEUTROPHILS # BLD AUTO: 6.36 K/UL (ref 1.8–7.7)
NEUTROPHILS NFR BLD AUTO: 68.3 % (ref 53–65)
NITRITE UR QL STRIP: NEGATIVE
NRBC # BLD AUTO: 0 X10E3/UL
NRBC, AUTO (.00): 0 %
PH UR STRIP: 6.5 PH UNITS
PLATELET # BLD AUTO: 58 K/UL (ref 150–400)
POTASSIUM SERPL-SCNC: 3.8 MMOL/L (ref 3.5–5.1)
PROT SERPL-MCNC: 5.6 G/DL (ref 6.4–8.2)
PROT UR QL STRIP: NEGATIVE
RBC # BLD AUTO: 4.08 M/UL (ref 4.2–5.4)
RBC # UR STRIP: NEGATIVE /UL
RH BLD: NORMAL
SODIUM SERPL-SCNC: 137 MMOL/L (ref 136–145)
SP GR UR STRIP: 1.01
SPECIMEN OUTDATE: NORMAL
SYPHILIS AB INTERPRETATION: NORMAL
UROBILINOGEN UR STRIP-ACNC: NORMAL MG/DL
WBC # BLD AUTO: 9.3 K/UL (ref 4.5–11)

## 2023-10-03 PROCEDURE — 87389 HIV-1 AG W/HIV-1&-2 AB AG IA: CPT | Performed by: ADVANCED PRACTICE MIDWIFE

## 2023-10-03 PROCEDURE — 86900 BLOOD TYPING SEROLOGIC ABO: CPT | Performed by: ADVANCED PRACTICE MIDWIFE

## 2023-10-03 PROCEDURE — 80053 COMPREHEN METABOLIC PANEL: CPT | Performed by: ADVANCED PRACTICE MIDWIFE

## 2023-10-03 PROCEDURE — 85025 COMPLETE CBC W/AUTO DIFF WBC: CPT | Performed by: ADVANCED PRACTICE MIDWIFE

## 2023-10-03 PROCEDURE — 11000001 HC ACUTE MED/SURG PRIVATE ROOM

## 2023-10-03 PROCEDURE — 87340 HEPATITIS B SURFACE AG IA: CPT | Performed by: ADVANCED PRACTICE MIDWIFE

## 2023-10-03 PROCEDURE — 25000003 PHARM REV CODE 250: Performed by: ADVANCED PRACTICE MIDWIFE

## 2023-10-03 PROCEDURE — 86780 TREPONEMA PALLIDUM: CPT | Performed by: ADVANCED PRACTICE MIDWIFE

## 2023-10-03 PROCEDURE — 81003 URINALYSIS AUTO W/O SCOPE: CPT | Performed by: ADVANCED PRACTICE MIDWIFE

## 2023-10-03 RX ORDER — SODIUM CHLORIDE, SODIUM LACTATE, POTASSIUM CHLORIDE, CALCIUM CHLORIDE 600; 310; 30; 20 MG/100ML; MG/100ML; MG/100ML; MG/100ML
INJECTION, SOLUTION INTRAVENOUS CONTINUOUS
Status: DISCONTINUED | OUTPATIENT
Start: 2023-10-03 | End: 2023-10-04

## 2023-10-03 RX ORDER — SODIUM CHLORIDE 9 MG/ML
INJECTION, SOLUTION INTRAVENOUS
Status: DISCONTINUED | OUTPATIENT
Start: 2023-10-03 | End: 2023-10-04

## 2023-10-03 RX ORDER — ONDANSETRON 4 MG/1
8 TABLET, ORALLY DISINTEGRATING ORAL EVERY 8 HOURS PRN
Status: DISCONTINUED | OUTPATIENT
Start: 2023-10-03 | End: 2023-10-04

## 2023-10-03 RX ORDER — MISOPROSTOL 100 MCG
25 TABLET ORAL EVERY 4 HOURS
Status: DISPENSED | OUTPATIENT
Start: 2023-10-03 | End: 2023-10-04

## 2023-10-03 RX ORDER — CARBOPROST TROMETHAMINE 250 UG/ML
250 INJECTION, SOLUTION INTRAMUSCULAR
Status: DISCONTINUED | OUTPATIENT
Start: 2023-10-03 | End: 2023-10-04

## 2023-10-03 RX ORDER — OXYTOCIN/RINGER'S LACTATE 30/500 ML
95 PLASTIC BAG, INJECTION (ML) INTRAVENOUS ONCE
Status: DISCONTINUED | OUTPATIENT
Start: 2023-10-03 | End: 2023-10-04

## 2023-10-03 RX ORDER — OXYTOCIN/RINGER'S LACTATE 30/500 ML
95 PLASTIC BAG, INJECTION (ML) INTRAVENOUS ONCE AS NEEDED
Status: DISCONTINUED | OUTPATIENT
Start: 2023-10-03 | End: 2023-10-04

## 2023-10-03 RX ORDER — MISOPROSTOL 200 UG/1
800 TABLET ORAL ONCE AS NEEDED
Status: DISCONTINUED | OUTPATIENT
Start: 2023-10-03 | End: 2023-10-04

## 2023-10-03 RX ORDER — METHYLERGONOVINE MALEATE 0.2 MG/ML
200 INJECTION INTRAVENOUS ONCE AS NEEDED
Status: DISCONTINUED | OUTPATIENT
Start: 2023-10-03 | End: 2023-10-04

## 2023-10-03 RX ORDER — SIMETHICONE 80 MG
1 TABLET,CHEWABLE ORAL 4 TIMES DAILY PRN
Status: DISCONTINUED | OUTPATIENT
Start: 2023-10-03 | End: 2023-10-04

## 2023-10-03 RX ORDER — CALCIUM CARBONATE 200(500)MG
500 TABLET,CHEWABLE ORAL 3 TIMES DAILY PRN
Status: DISCONTINUED | OUTPATIENT
Start: 2023-10-03 | End: 2023-10-04

## 2023-10-03 RX ORDER — LIDOCAINE HYDROCHLORIDE 10 MG/ML
10 INJECTION INFILTRATION; PERINEURAL ONCE AS NEEDED
Status: DISCONTINUED | OUTPATIENT
Start: 2023-10-03 | End: 2023-10-04

## 2023-10-03 RX ORDER — PROCHLORPERAZINE EDISYLATE 5 MG/ML
5 INJECTION INTRAMUSCULAR; INTRAVENOUS EVERY 6 HOURS PRN
Status: DISCONTINUED | OUTPATIENT
Start: 2023-10-03 | End: 2023-10-04

## 2023-10-03 RX ORDER — TRANEXAMIC ACID 10 MG/ML
1000 INJECTION, SOLUTION INTRAVENOUS EVERY 30 MIN PRN
Status: DISCONTINUED | OUTPATIENT
Start: 2023-10-03 | End: 2023-10-04

## 2023-10-03 RX ORDER — OXYTOCIN 10 [USP'U]/ML
10 INJECTION, SOLUTION INTRAMUSCULAR; INTRAVENOUS ONCE AS NEEDED
Status: DISCONTINUED | OUTPATIENT
Start: 2023-10-03 | End: 2023-10-04

## 2023-10-03 RX ORDER — DIPHENOXYLATE HYDROCHLORIDE AND ATROPINE SULFATE 2.5; .025 MG/1; MG/1
2 TABLET ORAL EVERY 6 HOURS PRN
Status: DISCONTINUED | OUTPATIENT
Start: 2023-10-03 | End: 2023-10-04

## 2023-10-03 RX ORDER — OXYTOCIN/RINGER'S LACTATE 30/500 ML
334 PLASTIC BAG, INJECTION (ML) INTRAVENOUS ONCE
Status: DISCONTINUED | OUTPATIENT
Start: 2023-10-03 | End: 2023-10-04

## 2023-10-03 RX ORDER — OXYTOCIN/RINGER'S LACTATE 30/500 ML
334 PLASTIC BAG, INJECTION (ML) INTRAVENOUS ONCE AS NEEDED
Status: DISCONTINUED | OUTPATIENT
Start: 2023-10-03 | End: 2023-10-04

## 2023-10-03 RX ORDER — TERBUTALINE SULFATE 1 MG/ML
0.25 INJECTION SUBCUTANEOUS
Status: DISCONTINUED | OUTPATIENT
Start: 2023-10-03 | End: 2023-10-04

## 2023-10-03 RX ADMIN — MISOPROSTOL 25 MCG: 100 TABLET ORAL at 09:10

## 2023-10-04 ENCOUNTER — ANESTHESIA (OUTPATIENT)
Dept: OBSTETRICS AND GYNECOLOGY | Facility: HOSPITAL | Age: 28
End: 2023-10-04
Payer: COMMERCIAL

## 2023-10-04 ENCOUNTER — ANESTHESIA EVENT (OUTPATIENT)
Dept: OBSTETRICS AND GYNECOLOGY | Facility: HOSPITAL | Age: 28
End: 2023-10-04
Payer: COMMERCIAL

## 2023-10-04 PROBLEM — Z34.03 ENCOUNTER FOR SUPERVISION OF NORMAL FIRST PREGNANCY IN THIRD TRIMESTER: Status: ACTIVE | Noted: 2023-10-04

## 2023-10-04 PROBLEM — D69.3 IDIOPATHIC THROMBOCYTOPENIC PURPURA (ITP): Status: ACTIVE | Noted: 2023-10-04

## 2023-10-04 PROBLEM — Z3A.39 39 WEEKS GESTATION OF PREGNANCY: Status: ACTIVE | Noted: 2023-10-04

## 2023-10-04 LAB
APTT PPP: 24 SECONDS (ref 25.2–37.3)
BASOPHILS # BLD AUTO: 0.04 K/UL (ref 0–0.2)
BASOPHILS NFR BLD AUTO: 0.4 % (ref 0–1)
D DIMER PPP FEU-MCNC: 1.42 ΜG/ML (ref 0–0.47)
DIFFERENTIAL METHOD BLD: ABNORMAL
EOSINOPHIL # BLD AUTO: 0.12 K/UL (ref 0–0.5)
EOSINOPHIL NFR BLD AUTO: 1.1 % (ref 1–4)
ERYTHROCYTE [DISTWIDTH] IN BLOOD BY AUTOMATED COUNT: 14.5 % (ref 11.5–14.5)
FSP TITR PPP LA: 395 MG/DL (ref 283–506)
HCO3 UR-SCNC: 25.8 MMOL/L
HCT VFR BLD AUTO: 35.2 % (ref 38–47)
HGB BLD-MCNC: 11.3 G/DL (ref 12–16)
IMM GRANULOCYTES # BLD AUTO: 0.09 K/UL (ref 0–0.04)
IMM GRANULOCYTES NFR BLD: 0.8 % (ref 0–0.4)
INR BLD: 0.88
INR BLD: 0.88
LYMPHOCYTES # BLD AUTO: 2.38 K/UL (ref 1–4.8)
LYMPHOCYTES NFR BLD AUTO: 22.5 % (ref 27–41)
MCH RBC QN AUTO: 27.2 PG (ref 27–31)
MCHC RBC AUTO-ENTMCNC: 32.1 G/DL (ref 32–36)
MCV RBC AUTO: 84.8 FL (ref 80–96)
MONOCYTES # BLD AUTO: 0.75 K/UL (ref 0–0.8)
MONOCYTES NFR BLD AUTO: 7.1 % (ref 2–6)
MPC BLD CALC-MCNC: 11.9 FL (ref 9.4–12.4)
NEUTROPHILS # BLD AUTO: 7.21 K/UL (ref 1.8–7.7)
NEUTROPHILS NFR BLD AUTO: 68.1 % (ref 53–65)
NRBC # BLD AUTO: 0 X10E3/UL
NRBC, AUTO (.00): 0 %
PCO2 BLDA: 49 MMHG
PH SMN: 7.33 [PH]
PLATELET # BLD AUTO: 57 K/UL (ref 150–400)
PO2 BLDA: 24 MMHG (ref 25–40)
POC BASE EXCESS: -0.7 MMOL/L
POC SATURATED O2: 48.8 %
PROTHROMBIN TIME: 11.9 SECONDS (ref 11.7–14.7)
PROTHROMBIN TIME: 11.9 SECONDS (ref 11.7–14.7)
RBC # BLD AUTO: 4.15 M/UL (ref 4.2–5.4)
WBC # BLD AUTO: 10.59 K/UL (ref 4.5–11)

## 2023-10-04 PROCEDURE — 85610 PROTHROMBIN TIME: CPT | Performed by: ADVANCED PRACTICE MIDWIFE

## 2023-10-04 PROCEDURE — 25000003 PHARM REV CODE 250: Performed by: NURSE ANESTHETIST, CERTIFIED REGISTERED

## 2023-10-04 PROCEDURE — 99900035 HC TECH TIME PER 15 MIN (STAT)

## 2023-10-04 PROCEDURE — 11000001 HC ACUTE MED/SURG PRIVATE ROOM

## 2023-10-04 PROCEDURE — 71000039 HC RECOVERY, EACH ADD'L HOUR: Performed by: STUDENT IN AN ORGANIZED HEALTH CARE EDUCATION/TRAINING PROGRAM

## 2023-10-04 PROCEDURE — 85730 THROMBOPLASTIN TIME PARTIAL: CPT | Performed by: ADVANCED PRACTICE MIDWIFE

## 2023-10-04 PROCEDURE — 37000009 HC ANESTHESIA EA ADD 15 MINS: Performed by: STUDENT IN AN ORGANIZED HEALTH CARE EDUCATION/TRAINING PROGRAM

## 2023-10-04 PROCEDURE — 25000003 PHARM REV CODE 250: Performed by: ADVANCED PRACTICE MIDWIFE

## 2023-10-04 PROCEDURE — 37000008 HC ANESTHESIA 1ST 15 MINUTES: Performed by: STUDENT IN AN ORGANIZED HEALTH CARE EDUCATION/TRAINING PROGRAM

## 2023-10-04 PROCEDURE — 59514 CESAREAN DELIVERY ONLY: CPT | Mod: QX,P3,CRNA, | Performed by: NURSE ANESTHETIST, CERTIFIED REGISTERED

## 2023-10-04 PROCEDURE — 63600175 PHARM REV CODE 636 W HCPCS: Performed by: NURSE ANESTHETIST, CERTIFIED REGISTERED

## 2023-10-04 PROCEDURE — 27000165 HC TUBE, ETT CUFFED: Performed by: NURSE ANESTHETIST, CERTIFIED REGISTERED

## 2023-10-04 PROCEDURE — 27000716 HC OXISENSOR PROBE, ANY SIZE: Performed by: NURSE ANESTHETIST, CERTIFIED REGISTERED

## 2023-10-04 PROCEDURE — 71000033 HC RECOVERY, INTIAL HOUR: Performed by: STUDENT IN AN ORGANIZED HEALTH CARE EDUCATION/TRAINING PROGRAM

## 2023-10-04 PROCEDURE — 25000003 PHARM REV CODE 250: Performed by: STUDENT IN AN ORGANIZED HEALTH CARE EDUCATION/TRAINING PROGRAM

## 2023-10-04 PROCEDURE — 27000655: Performed by: NURSE ANESTHETIST, CERTIFIED REGISTERED

## 2023-10-04 PROCEDURE — 82803 BLOOD GASES ANY COMBINATION: CPT

## 2023-10-04 PROCEDURE — 36004724 HC OB OR TIME LEV III - 1ST 15 MIN: Performed by: STUDENT IN AN ORGANIZED HEALTH CARE EDUCATION/TRAINING PROGRAM

## 2023-10-04 PROCEDURE — 27201423 OPTIME MED/SURG SUP & DEVICES STERILE SUPPLY: Performed by: STUDENT IN AN ORGANIZED HEALTH CARE EDUCATION/TRAINING PROGRAM

## 2023-10-04 PROCEDURE — 63600175 PHARM REV CODE 636 W HCPCS: Performed by: STUDENT IN AN ORGANIZED HEALTH CARE EDUCATION/TRAINING PROGRAM

## 2023-10-04 PROCEDURE — 59514 CESAREAN DELIVERY ONLY: CPT | Mod: QK,P3,ANES, | Performed by: ANESTHESIOLOGY

## 2023-10-04 PROCEDURE — 27000510 HC BLANKET BAIR HUGGER ANY SIZE: Performed by: NURSE ANESTHETIST, CERTIFIED REGISTERED

## 2023-10-04 PROCEDURE — 85384 FIBRINOGEN ACTIVITY: CPT | Performed by: ADVANCED PRACTICE MIDWIFE

## 2023-10-04 PROCEDURE — 59514 PRA REAN DELIVERY ONLY: ICD-10-PCS | Mod: QK,P3,ANES, | Performed by: ANESTHESIOLOGY

## 2023-10-04 PROCEDURE — 63600175 PHARM REV CODE 636 W HCPCS: Performed by: ADVANCED PRACTICE MIDWIFE

## 2023-10-04 PROCEDURE — 36004725 HC OB OR TIME LEV III - EA ADD 15 MIN: Performed by: STUDENT IN AN ORGANIZED HEALTH CARE EDUCATION/TRAINING PROGRAM

## 2023-10-04 PROCEDURE — 59400 OBSTETRICAL CARE: CPT | Mod: ,,, | Performed by: STUDENT IN AN ORGANIZED HEALTH CARE EDUCATION/TRAINING PROGRAM

## 2023-10-04 PROCEDURE — 85379 FIBRIN DEGRADATION QUANT: CPT | Performed by: ADVANCED PRACTICE MIDWIFE

## 2023-10-04 PROCEDURE — 27000689 HC BLADE LARYNGOSCOPE ANY SIZE: Performed by: NURSE ANESTHETIST, CERTIFIED REGISTERED

## 2023-10-04 PROCEDURE — 85025 COMPLETE CBC W/AUTO DIFF WBC: CPT | Performed by: ADVANCED PRACTICE MIDWIFE

## 2023-10-04 PROCEDURE — 59514 PRA REAN DELIVERY ONLY: ICD-10-PCS | Mod: QX,P3,CRNA, | Performed by: NURSE ANESTHETIST, CERTIFIED REGISTERED

## 2023-10-04 PROCEDURE — 59400 PR FULL ROUT OBSTE CARE,VAGINAL DELIV: ICD-10-PCS | Mod: ,,, | Performed by: STUDENT IN AN ORGANIZED HEALTH CARE EDUCATION/TRAINING PROGRAM

## 2023-10-04 RX ORDER — ETOMIDATE 2 MG/ML
INJECTION INTRAVENOUS
Status: DISCONTINUED | OUTPATIENT
Start: 2023-10-04 | End: 2023-10-04

## 2023-10-04 RX ORDER — TRANEXAMIC ACID 10 MG/ML
1000 INJECTION, SOLUTION INTRAVENOUS EVERY 30 MIN PRN
Status: DISCONTINUED | OUTPATIENT
Start: 2023-10-04 | End: 2023-10-06 | Stop reason: HOSPADM

## 2023-10-04 RX ORDER — CARBOPROST TROMETHAMINE 250 UG/ML
250 INJECTION, SOLUTION INTRAMUSCULAR
Status: DISCONTINUED | OUTPATIENT
Start: 2023-10-04 | End: 2023-10-06 | Stop reason: HOSPADM

## 2023-10-04 RX ORDER — PRENATAL WITH FERROUS FUM AND FOLIC ACID 3080; 920; 120; 400; 22; 1.84; 3; 20; 10; 1; 12; 200; 27; 25; 2 [IU]/1; [IU]/1; MG/1; [IU]/1; MG/1; MG/1; MG/1; MG/1; MG/1; MG/1; UG/1; MG/1; MG/1; MG/1; MG/1
1 TABLET ORAL DAILY
Status: DISCONTINUED | OUTPATIENT
Start: 2023-10-04 | End: 2023-10-06 | Stop reason: HOSPADM

## 2023-10-04 RX ORDER — OXYTOCIN/RINGER'S LACTATE 30/500 ML
95 PLASTIC BAG, INJECTION (ML) INTRAVENOUS ONCE AS NEEDED
Status: DISCONTINUED | OUTPATIENT
Start: 2023-10-04 | End: 2023-10-06 | Stop reason: HOSPADM

## 2023-10-04 RX ORDER — SODIUM CITRATE AND CITRIC ACID MONOHYDRATE 334; 500 MG/5ML; MG/5ML
30 SOLUTION ORAL ONCE
Status: COMPLETED | OUTPATIENT
Start: 2023-10-04 | End: 2023-10-04

## 2023-10-04 RX ORDER — OXYCODONE AND ACETAMINOPHEN 5; 325 MG/1; MG/1
1 TABLET ORAL EVERY 4 HOURS PRN
Status: DISCONTINUED | OUTPATIENT
Start: 2023-10-04 | End: 2023-10-06 | Stop reason: HOSPADM

## 2023-10-04 RX ORDER — SODIUM CHLORIDE, SODIUM LACTATE, POTASSIUM CHLORIDE, CALCIUM CHLORIDE 600; 310; 30; 20 MG/100ML; MG/100ML; MG/100ML; MG/100ML
INJECTION, SOLUTION INTRAVENOUS
Status: DISPENSED
Start: 2023-10-04 | End: 2023-10-05

## 2023-10-04 RX ORDER — OXYCODONE AND ACETAMINOPHEN 10; 325 MG/1; MG/1
1 TABLET ORAL EVERY 4 HOURS PRN
Status: DISCONTINUED | OUTPATIENT
Start: 2023-10-04 | End: 2023-10-06 | Stop reason: HOSPADM

## 2023-10-04 RX ORDER — BISACODYL 10 MG
10 SUPPOSITORY, RECTAL RECTAL ONCE AS NEEDED
Status: DISCONTINUED | OUTPATIENT
Start: 2023-10-04 | End: 2023-10-06 | Stop reason: HOSPADM

## 2023-10-04 RX ORDER — PROCHLORPERAZINE EDISYLATE 5 MG/ML
5 INJECTION INTRAMUSCULAR; INTRAVENOUS EVERY 6 HOURS PRN
Status: DISCONTINUED | OUTPATIENT
Start: 2023-10-04 | End: 2023-10-06 | Stop reason: HOSPADM

## 2023-10-04 RX ORDER — FENTANYL CITRATE 50 UG/ML
INJECTION, SOLUTION INTRAMUSCULAR; INTRAVENOUS
Status: DISCONTINUED | OUTPATIENT
Start: 2023-10-04 | End: 2023-10-04

## 2023-10-04 RX ORDER — DIPHENOXYLATE HYDROCHLORIDE AND ATROPINE SULFATE 2.5; .025 MG/1; MG/1
2 TABLET ORAL EVERY 6 HOURS PRN
Status: DISCONTINUED | OUTPATIENT
Start: 2023-10-04 | End: 2023-10-06 | Stop reason: HOSPADM

## 2023-10-04 RX ORDER — ACETAMINOPHEN 10 MG/ML
1000 INJECTION, SOLUTION INTRAVENOUS ONCE
Status: COMPLETED | OUTPATIENT
Start: 2023-10-04 | End: 2023-10-04

## 2023-10-04 RX ORDER — DOCUSATE SODIUM 100 MG/1
200 CAPSULE, LIQUID FILLED ORAL 2 TIMES DAILY
Status: DISCONTINUED | OUTPATIENT
Start: 2023-10-04 | End: 2023-10-06 | Stop reason: HOSPADM

## 2023-10-04 RX ORDER — LIDOCAINE HYDROCHLORIDE 20 MG/ML
INJECTION, SOLUTION EPIDURAL; INFILTRATION; INTRACAUDAL; PERINEURAL
Status: DISCONTINUED | OUTPATIENT
Start: 2023-10-04 | End: 2023-10-04

## 2023-10-04 RX ORDER — SODIUM CHLORIDE, SODIUM LACTATE, POTASSIUM CHLORIDE, CALCIUM CHLORIDE 600; 310; 30; 20 MG/100ML; MG/100ML; MG/100ML; MG/100ML
INJECTION, SOLUTION INTRAVENOUS CONTINUOUS
Status: DISCONTINUED | OUTPATIENT
Start: 2023-10-04 | End: 2023-10-06 | Stop reason: HOSPADM

## 2023-10-04 RX ORDER — ADHESIVE BANDAGE
30 BANDAGE TOPICAL 2 TIMES DAILY PRN
Status: DISCONTINUED | OUTPATIENT
Start: 2023-10-05 | End: 2023-10-06 | Stop reason: HOSPADM

## 2023-10-04 RX ORDER — AMOXICILLIN 250 MG
1 CAPSULE ORAL NIGHTLY PRN
Status: DISCONTINUED | OUTPATIENT
Start: 2023-10-04 | End: 2023-10-06 | Stop reason: HOSPADM

## 2023-10-04 RX ORDER — SIMETHICONE 80 MG
1 TABLET,CHEWABLE ORAL EVERY 6 HOURS PRN
Status: DISCONTINUED | OUTPATIENT
Start: 2023-10-04 | End: 2023-10-06 | Stop reason: HOSPADM

## 2023-10-04 RX ORDER — ONDANSETRON 2 MG/ML
INJECTION INTRAMUSCULAR; INTRAVENOUS
Status: DISCONTINUED | OUTPATIENT
Start: 2023-10-04 | End: 2023-10-04

## 2023-10-04 RX ORDER — ROCURONIUM BROMIDE 10 MG/ML
INJECTION, SOLUTION INTRAVENOUS
Status: DISCONTINUED | OUTPATIENT
Start: 2023-10-04 | End: 2023-10-04

## 2023-10-04 RX ORDER — HYDROMORPHONE HCL IN 0.9% NACL 6 MG/30 ML
PATIENT CONTROLLED ANALGESIA SYRINGE INTRAVENOUS CONTINUOUS
Status: DISCONTINUED | OUTPATIENT
Start: 2023-10-04 | End: 2023-10-05

## 2023-10-04 RX ORDER — OXYTOCIN 10 [USP'U]/ML
INJECTION, SOLUTION INTRAMUSCULAR; INTRAVENOUS
Status: DISCONTINUED | OUTPATIENT
Start: 2023-10-04 | End: 2023-10-04

## 2023-10-04 RX ORDER — MISOPROSTOL 200 UG/1
800 TABLET ORAL ONCE AS NEEDED
Status: DISCONTINUED | OUTPATIENT
Start: 2023-10-04 | End: 2023-10-06 | Stop reason: HOSPADM

## 2023-10-04 RX ORDER — OXYTOCIN 10 [USP'U]/ML
10 INJECTION, SOLUTION INTRAMUSCULAR; INTRAVENOUS ONCE AS NEEDED
Status: DISCONTINUED | OUTPATIENT
Start: 2023-10-04 | End: 2023-10-06 | Stop reason: HOSPADM

## 2023-10-04 RX ORDER — ACETAMINOPHEN 500 MG
1000 TABLET ORAL EVERY 8 HOURS
Status: DISPENSED | OUTPATIENT
Start: 2023-10-04 | End: 2023-10-05

## 2023-10-04 RX ORDER — ONDANSETRON 4 MG/1
8 TABLET, ORALLY DISINTEGRATING ORAL EVERY 8 HOURS PRN
Status: DISCONTINUED | OUTPATIENT
Start: 2023-10-04 | End: 2023-10-06 | Stop reason: HOSPADM

## 2023-10-04 RX ORDER — NALOXONE HCL 0.4 MG/ML
0.02 VIAL (ML) INJECTION
Status: DISCONTINUED | OUTPATIENT
Start: 2023-10-04 | End: 2023-10-06 | Stop reason: HOSPADM

## 2023-10-04 RX ORDER — DIPHENHYDRAMINE HCL 25 MG
25 CAPSULE ORAL EVERY 4 HOURS PRN
Status: DISCONTINUED | OUTPATIENT
Start: 2023-10-04 | End: 2023-10-06 | Stop reason: HOSPADM

## 2023-10-04 RX ORDER — OXYTOCIN/RINGER'S LACTATE 30/500 ML
334 PLASTIC BAG, INJECTION (ML) INTRAVENOUS ONCE AS NEEDED
Status: DISCONTINUED | OUTPATIENT
Start: 2023-10-04 | End: 2023-10-06 | Stop reason: HOSPADM

## 2023-10-04 RX ORDER — FAMOTIDINE 10 MG/ML
20 INJECTION INTRAVENOUS 2 TIMES DAILY
Status: DISCONTINUED | OUTPATIENT
Start: 2023-10-04 | End: 2023-10-04

## 2023-10-04 RX ORDER — OXYTOCIN/RINGER'S LACTATE 30/500 ML
95 PLASTIC BAG, INJECTION (ML) INTRAVENOUS ONCE
Status: DISCONTINUED | OUTPATIENT
Start: 2023-10-04 | End: 2023-10-06 | Stop reason: HOSPADM

## 2023-10-04 RX ORDER — METHYLERGONOVINE MALEATE 0.2 MG/ML
200 INJECTION INTRAVENOUS ONCE AS NEEDED
Status: DISCONTINUED | OUTPATIENT
Start: 2023-10-04 | End: 2023-10-06 | Stop reason: HOSPADM

## 2023-10-04 RX ORDER — IBUPROFEN 800 MG/1
800 TABLET ORAL EVERY 6 HOURS
Status: DISCONTINUED | OUTPATIENT
Start: 2023-10-04 | End: 2023-10-06 | Stop reason: HOSPADM

## 2023-10-04 RX ORDER — DEXAMETHASONE SODIUM PHOSPHATE 4 MG/ML
INJECTION, SOLUTION INTRA-ARTICULAR; INTRALESIONAL; INTRAMUSCULAR; INTRAVENOUS; SOFT TISSUE
Status: DISCONTINUED | OUTPATIENT
Start: 2023-10-04 | End: 2023-10-04

## 2023-10-04 RX ORDER — ONDANSETRON 2 MG/ML
4 INJECTION INTRAMUSCULAR; INTRAVENOUS ONCE
Status: COMPLETED | OUTPATIENT
Start: 2023-10-04 | End: 2023-10-04

## 2023-10-04 RX ADMIN — FAMOTIDINE 20 MG: 10 INJECTION, SOLUTION INTRAVENOUS at 12:10

## 2023-10-04 RX ADMIN — DEXAMETHASONE SODIUM PHOSPHATE 12 MG: 4 INJECTION, SOLUTION INTRA-ARTICULAR; INTRALESIONAL; INTRAMUSCULAR; INTRAVENOUS; SOFT TISSUE at 01:10

## 2023-10-04 RX ADMIN — Medication: at 03:10

## 2023-10-04 RX ADMIN — ROCURONIUM BROMIDE 100 MG: 10 INJECTION, SOLUTION INTRAVENOUS at 01:10

## 2023-10-04 RX ADMIN — LIDOCAINE HYDROCHLORIDE 100 MG: 20 INJECTION, SOLUTION INTRAVENOUS at 01:10

## 2023-10-04 RX ADMIN — ONDANSETRON 4 MG: 2 INJECTION INTRAMUSCULAR; INTRAVENOUS at 01:10

## 2023-10-04 RX ADMIN — IBUPROFEN 800 MG: 800 TABLET, FILM COATED ORAL at 11:10

## 2023-10-04 RX ADMIN — ACETAMINOPHEN 1000 MG: 500 TABLET ORAL at 11:10

## 2023-10-04 RX ADMIN — SODIUM CHLORIDE, POTASSIUM CHLORIDE, SODIUM LACTATE AND CALCIUM CHLORIDE: 600; 310; 30; 20 INJECTION, SOLUTION INTRAVENOUS at 09:10

## 2023-10-04 RX ADMIN — ONDANSETRON 4 MG: 2 INJECTION INTRAMUSCULAR; INTRAVENOUS at 12:10

## 2023-10-04 RX ADMIN — ACETAMINOPHEN 1000 MG: 10 INJECTION, SOLUTION INTRAVENOUS at 04:10

## 2023-10-04 RX ADMIN — SODIUM CHLORIDE: 9 INJECTION, SOLUTION INTRAVENOUS at 01:10

## 2023-10-04 RX ADMIN — FENTANYL CITRATE 100 MCG: 50 INJECTION INTRAMUSCULAR; INTRAVENOUS at 01:10

## 2023-10-04 RX ADMIN — MISOPROSTOL 25 MCG: 100 TABLET ORAL at 02:10

## 2023-10-04 RX ADMIN — SUGAMMADEX 200 MG: 100 INJECTION, SOLUTION INTRAVENOUS at 01:10

## 2023-10-04 RX ADMIN — OXYTOCIN 20 UNITS: 10 INJECTION, SOLUTION INTRAMUSCULAR; INTRAVENOUS at 01:10

## 2023-10-04 RX ADMIN — CEFAZOLIN 2 G: 2 INJECTION, POWDER, FOR SOLUTION INTRAMUSCULAR; INTRAVENOUS at 08:10

## 2023-10-04 RX ADMIN — SODIUM CITRATE AND CITRIC ACID MONOHYDRATE 30 ML: 500; 334 SOLUTION ORAL at 12:10

## 2023-10-04 RX ADMIN — ETOMIDATE 20 MG: 2 INJECTION, SOLUTION INTRAVENOUS at 01:10

## 2023-10-04 RX ADMIN — CEFAZOLIN 2 G: 2 INJECTION, POWDER, FOR SOLUTION INTRAMUSCULAR; INTRAVENOUS at 12:10

## 2023-10-04 NOTE — TRANSFER OF CARE
"Anesthesia Transfer of Care Note    Patient: Dominique Tomlin    Procedure(s) Performed: Procedure(s) (LRB):   SECTION (N/A)    Patient location: Labor and Delivery    Anesthesia Type: general    Transport from OR: Transported from OR on 100% O2 by closed face mask with adequate spontaneous ventilation    Post pain: adequate analgesia    Post assessment: no apparent anesthetic complications    Post vital signs: stable    Level of consciousness: responds to stimulation    Nausea/Vomiting: no nausea/vomiting    Complications: none    Transfer of care protocol was followedComments: Pt was fully awake and alert when CRNA left in ob room      Last vitals:   Visit Vitals  /62 (BP Location: Right arm, Patient Position: Lying)   Pulse 75   Temp 36.1 °C (97 °F) (Oral)   Resp 15   Ht 4' 11" (1.499 m)   Wt 77 kg (169 lb 12.8 oz)   LMP 2022 (Exact Date)   SpO2 98%   Breastfeeding Yes   BMI 34.30 kg/m²     "

## 2023-10-04 NOTE — H&P
[facility] -  Labor and Delivery  Obstetrics  History & Physical    Patient Name: Dominique Tomlin  MRN: [id number]  Admission Date: 10/3/2023  Primary Care Provider: Anita Celis MD    Subjective:     Principal Problem:Idiopathic thrombocytopenic purpura (ITP)    History of Present Illness: Admitted for cervical ripening and pt has had 2 doses of Cytotec throughout the night.  She denies pain or discomfort at present.  Pt has had adequate PN care. Pt's mother and  in room this morning.  Pt's mother states she has a hx of ITP along with her mother also.      Obstetric HPI:  Patient reports irregular contractions, active fetal movement, No vaginal bleeding , No loss of fluid     This pregnancy has been complicated by ITP    OB History    Para Term  AB Living   1 0 0 0 0 0   SAB IAB Ectopic Multiple Live Births   0 0 0 0 0      # Outcome Date GA Lbr Wayne/2nd Weight Sex Delivery Anes PTL Lv   1 Current              Past Medical History:   Diagnosis Date    Acne      No past surgical history on file.    PTA Medications   Medication Sig    prenatal vit no.124/iron/folic (PRENATAL VITAMIN ORAL) Take 1 tablet by mouth once daily.    amoxicillin (AMOXIL) 500 MG capsule Take 500 mg by mouth every 12 (twelve) hours.    clindamycin (CLEOCIN T) 1 % Swab Apply topically 2 (two) times daily.       Review of patient's allergies indicates:  No Known Allergies     Family History       Problem Relation (Age of Onset)    Prostate cancer Maternal Grandfather          Tobacco Use    Smoking status: Never    Smokeless tobacco: Never   Substance and Sexual Activity    Alcohol use: Never    Drug use: Never    Sexual activity: Yes     Partners: Male     Birth control/protection: None     Review of Systems   Constitutional:  Negative for fever.   Respiratory:  Negative for shortness of breath.    Cardiovascular:  Negative for leg swelling.   Gastrointestinal:  Negative for abdominal pain, nausea and  vomiting.   Genitourinary:  Negative for vaginal bleeding.   Musculoskeletal:  Positive for back pain.   Neurological:  Negative for headaches.      Objective:     Vital Signs (Most Recent):  Temp: 97 °F (36.1 °C) (10/04/23 0717)  Pulse: 84 (10/04/23 0730)  Resp: 18 (10/04/23 0716)  BP: 123/83 (10/04/23 0730)  SpO2: 98 % (10/04/23 0716) Vital Signs (24h Range):  Temp:  [97 °F (36.1 °C)-97.7 °F (36.5 °C)] 97 °F (36.1 °C)  Pulse:  [69-98] 84  Resp:  [18-20] 18  SpO2:  [97 %-100 %] 98 %  BP: ()/(51-85) 123/83     Weight: 77 kg (169 lb 12.8 oz)  Body mass index is 34.3 kg/m².    FHT: 150 Cat 1 (reassuring)  TOCO:  Q 3-5 minutes    Physical Exam:   Constitutional: She is oriented to person, place, and time. She appears well-developed and well-nourished. No distress.    HENT:   Head: Normocephalic.      Cardiovascular:  Normal rate and regular rhythm.      Exam reveals no edema.        Pulmonary/Chest: Effort normal and breath sounds normal.        Abdominal: Soft.   Gravid, non-tender                 Neurological: She is alert and oriented to person, place, and time. She has normal reflexes.    Skin: Skin is warm and dry.    Psychiatric: She has a normal mood and affect.       Cervix:  Dilation:  0  Effacement:  50%  Station: -3  Presentation: Vertex     Significant Labs:  Lab Results   Component Value Date    GROUPTRH O POS 10/03/2023    HEPBSAG Non-Reactive 10/03/2023       CBC:   Recent Labs   Lab 10/04/23  0632   WBC 10.59   RBC 4.15*   HGB 11.3*   HCT 35.2*   PLT 57*   MCV 84.8   MCH 27.2   MCHC 32.1     CMP:   Recent Labs   Lab 10/03/23  2057   *   CALCIUM 8.2*   ALBUMIN 2.4*   PROT 5.6*      K 3.8   CO2 21      BUN 9   CREATININE 0.61   ALKPHOS 183*   ALT 22   AST 20   BILITOT 0.9     Coagulation:   Recent Labs   Lab 10/04/23  0632   INR 0.88  0.88   APTT 24.0*     I have personallly reviewed all pertinent lab results from the last 24 hours.    Assessment/Plan:     28 y.o. female   at 39w4d for:    Active Diagnoses:    Diagnosis Date Noted POA    PRINCIPAL PROBLEM:  Idiopathic thrombocytopenic purpura (ITP) [D69.3] 10/04/2023 Yes    Encounter for supervision of normal first pregnancy in third trimester [Z34.03] 10/04/2023 Not Applicable    39 weeks gestation of pregnancy [Z3A.39] 10/04/2023 Not Applicable      Problems Resolved During this Admission:       Discussed options for delivery including continue with induction and IV meds for pain management and that pt cannot have epidural anesthesia due to ITP.  Discussed R/B of PCS including infection, bleeding, damage to adjacent organs, and need for general anesthesia.  Pt wishes to proceed with  Delivery.     Dr Sosa aware of pt status and in agreement with POC.  Plan for PCS at Noon today.     Nunu Chandra CNM  Obstetrics  [facility] -  Labor and Delivery

## 2023-10-04 NOTE — ANESTHESIA PROCEDURE NOTES
Intubation    Date/Time: 10/4/2023 1:02 PM    Performed by: Ryley Radford CRNA  Authorized by: Tyler Paul MD    Intubation:     Induction:  Intravenous    Intubated:  Postinduction    Mask Ventilation:  Easy mask    Attempts:  1    Attempted By:  CRNA    Method of Intubation:  Direct    Blade:  Mable 4    Laryngeal View Grade: Grade I - full view of cords      Difficult Airway Encountered?: No      Complications:  None    Airway Device:  Oral endotracheal tube    Airway Device Size:  6.5    Style/Cuff Inflation:  Cuffed    Inflation Amount (mL):  7    Tube secured:  21    Placement Verified By:  Capnometry    Complicating Factors:  None    Findings Post-Intubation:  BS equal bilateral and atraumatic/condition of teeth unchanged

## 2023-10-04 NOTE — ANESTHESIA PREPROCEDURE EVALUATION
10/04/2023  Dominique Tomlin is a 28 y.o., female.      Pre-op Assessment    I have reviewed the Patient Summary Reports.     I have reviewed the Nursing Notes. I have reviewed the NPO Status.   I have reviewed the Medications.     Review of Systems  Anesthesia Hx:  Denies Family Hx of Anesthesia complications.   Denies Personal Hx of Anesthesia complications.   Social:  Non-Smoker, No Alcohol Use    Hematology/Oncology:  Hematology Normal   Oncology Normal   Hematology Comments: Low platelets    EENT/Dental:EENT/Dental Normal   Cardiovascular:  Cardiovascular Normal     Pulmonary:  Pulmonary Normal    Renal/:  Renal/ Normal     Hepatic/GI:  Hepatic/GI Normal    Musculoskeletal:  Musculoskeletal Normal    OB/GYN/PEDS:  IUP, FTP   Neurological:  Neurology Normal    Endocrine:  Endocrine Normal    Dermatological:  Skin Normal    Psych:  Psychiatric Normal           Physical Exam  General: Well nourished, Cooperative, Alert and Oriented    Airway:  Mallampati: II / II  Mouth Opening: Normal  TM Distance: Normal  Neck ROM: Normal ROM    Dental:  Intact    Chest/Lungs:  Clear to auscultation    Heart:  Rate: Normal  Rhythm: Regular Rhythm  Sounds: Normal        Chemistry        Component Value Date/Time     10/03/2023 2057    K 3.8 10/03/2023 2057     10/03/2023 2057    CO2 21 10/03/2023 2057    BUN 9 10/03/2023 2057    CREATININE 0.61 10/03/2023 2057     (H) 10/03/2023 2057        Component Value Date/Time    CALCIUM 8.2 (L) 10/03/2023 2057    ALKPHOS 183 (H) 10/03/2023 2057    AST 20 10/03/2023 2057    ALT 22 10/03/2023 2057    BILITOT 0.9 10/03/2023 2057        Lab Results   Component Value Date    WBC 10.59 10/04/2023    HGB 11.3 (L) 10/04/2023    HCT 35.2 (L) 10/04/2023    PLT 57 (L) 10/04/2023     No results found for this or any previous visit.      Anesthesia Plan  Type of Anesthesia,  risks & benefits discussed:    Anesthesia Type: Gen ETT  Intra-op Monitoring Plan: Standard ASA Monitors  Post Op Pain Control Plan: multimodal analgesia  Informed Consent: Informed consent signed with the Patient and all parties understand the risks and agree with anesthesia plan.  All questions answered. Patient consented to blood products? Yes  ASA Score: 3  Day of Surgery Review of History & Physical: H&P Update referred to the surgeon/provider.I have interviewed and examined the patient. I have reviewed the patient's H&P dated: There are no significant changes.   Anesthesia Plan Notes: Low platelets, will do GETA    Ready For Surgery From Anesthesia Perspective.     .       79 y.o F presents to ED s/p fall onto her face about 1 hour PTA causing some upper teeth to fall out, nose pain, b/l knee pain and abrasions to her chin and nose. Pt notes she had dental work in the past with bridged front upper teeth. Denies LOC 79 y.o F presents to ED s/p fall onto her face about 1 hour PTA causing her dental bridge to fall out, nose pain, b/l knee abrasions to and nose. No LOC.

## 2023-10-04 NOTE — L&D DELIVERY NOTE
[facility] -  Labor and Delivery   Section   Operative Note    SUMMARY     Date of Procedure: 10/4/2023     Procedure: Procedure(s) (LRB):   SECTION (N/A)    Surgeon(s) and Role:     * Jermaine Sosa,  - Primary    Assisting Surgeon: None    Pre-Operative Diagnosis: Other (Add Comments)    Post-Operative Diagnosis: Post-Op Diagnosis Codes:     * Pregnant [Z34.90]    Anesthesia: General    Technical Procedures Used: PLTCS           Description of the Findings of the Procedure: normal appearing uterus, viable female  in cephalic presentation      Significant Surgical Tasks Conducted by the Assistant(s), if Applicable: NA    Complications: No    Blood Loss: 900 mL     Patient was taken to OR with IVF running. Spinal anesthesia was placed without difficulty.  With patient in supine position, the legs are  and Jain Catheter placed and positioning to supine done.   Abdomen prepped with Chloroprep and 3 minute drying time allowed prior to draping of the abdomen.   Time out taken with OR team members.    Pfannenstiel Incision made through the skin, transverse fascial incision developed, rectus muscles  in the midline and the peritoneum entered.   no adhesions noted.    Florentino retractor was inserted into the abdominal cavity.  The lower uterine segment and position of the fetus identified.   Bladder flap taken down through transverse peritoneal incision.    Low Transverse Incision made through well developed lower uterine segment and extended laterally with blunt dissection.   Clear fluid noted.  Infant delivered from vertex presentation.  Cord clamped after one minute and  handed to attending nurse.  Cord blood taken, placenta delivered.  The uterus was cleared of all clot and debris.  The edges of the uterine incision are grasped with ring clamps at the angles and the inferior midline edge of the incision.    Closure with running lock 0 monocryl, starting  at left angle, tying at right angle.  A second imbricating layer was placed with 0 monocryl.   Observation for bleeding with suture of any bleeding along the hysterotomy line.   With good hemostasis noted, the anterior pelvis is rinsed with sterile saline.     Florentino retractor was removed from the abdomen.   Closure of the abdomen with 3-0 monocryl running of the peritoneum.  Rectus muscle was reapproximated with 0 chromic.  Fascia was closed with 0 looped PDS starting at the left angle and tying the knot at the right angle.  Subcutaneous tissue was reapproximated with 2-0 plain gut.  Skin closure with 3-0 monocryl subcuticular.  Wound dressed with glue, pressure dressing.          Specimens:   Specimen (24h ago, onward)      None            Condition: Good    VTE Risk Mitigation (From admission, onward)      None            Disposition: PACU - hemodynamically stable.    Attestation: Good         Delivery Information for Girl Dominique Tomlin    Birth information:  YOB: [birth year]23   Time of birth: 1:05 PM   Sex: female   Head Delivery Date/Time: 10/4/2023  1:05 PM   Delivery type: , Low Transverse   Gestational Age: 39w4d        Delivery Providers    Delivering clinician: Jermaine Sosa DO           Measurements    Weight:   Length:          Apgars    Living status: Living  Apgar Component Scores:  1 min.:  5 min.:  10 min.:  15 min.:  20 min.:    Skin color:  0  1       Heart rate:  1  1       Reflex irritability:  2  2       Muscle tone:  2  2       Respiratory effort:  2  2       Total:  7  8                Operative Delivery    Forceps attempted?: No  Vacuum extractor attempted?: No         Shoulder Dystocia    Shoulder dystocia present?: No           Presentation    Presentation: Vertex  Position: Middle           Interventions/Resuscitation    Method: Bulb Suctioning, Tactile Stimulation       Cord    Vessels: 3 vessels  Complications: None  Delayed Cord Clamping?: No  Cord Clamped  Date/Time: 10/4/2023  1:08 PM  Cord Blood Disposition: Sent with Baby  Gases Sent?: Yes  Stem Cell Collection (by MD): No       Placenta    Placenta delivery date/time: 10/4/2023 1308  Placenta removal: Manual removal  Placenta appearance: Intact  Placenta disposition: Donation           Labor Events:       labor: No     Labor Onset Date/Time: 10/03/2023 20:00     Dilation Complete Date/Time:         Start Pushing Date/Time:       Rupture Date/Time:            Rupture type: INT (Intact)         Fluid Amount:       Fluid Color:        steroids: Unknown     Antibiotics given for GBS: No     Induction: misoprostol     Indications for induction:        Augmentation:       Indications for augmentation:       Labor complications: None     Additional complications:          Cervical ripening:                     Delivery:      Episiotomy: None     Indication for Episiotomy:       Perineal Lacerations: None Repaired:      Periurethral Laceration:   Repaired:     Labial Laceration:   Repaired:     Sulcus Laceration:   Repaired:     Vaginal Laceration:   Repaired:     Cervical Laceration:   Repaired:     Repair suture: None     Repair # of packets:       Last Value - EBL - Nursing (mL):       Sum - EBL - Nursing (mL): 900     Last Value - EBL - Anesthesia (mL): 900      Calculated QBL (mL):       Vaginal Sweep Performed: Yes     Surgicount Correct: Yes       Other providers:       Anesthesia    Method: General          Details (if applicable):  Trial of Labor      Categorization: Primary    Priority:     Indications for :     Incision Type: low transverse     Additional  information:  Forceps:    Vacuum:    Breech:    Observed anomalies    Other (Comments):

## 2023-10-04 NOTE — PLAN OF CARE
Problem: Adult Inpatient Plan of Care  Goal: Plan of Care Review  10/4/2023 1458 by Kizzy Guadarrama RN  Outcome: Ongoing, Progressing  10/4/2023 0723 by Kizzy Guadarrama RN  Outcome: Ongoing, Progressing  Goal: Patient-Specific Goal (Individualized)  10/4/2023 1458 by Kizzy Guadarrama RN  Outcome: Ongoing, Progressing  10/4/2023 0723 by Kizzy Guadarrama RN  Outcome: Ongoing, Progressing  Goal: Absence of Hospital-Acquired Illness or Injury  10/4/2023 1458 by Kizzy Guadarrama RN  Outcome: Ongoing, Progressing  10/4/2023 0723 by Kizzy Guadarrama RN  Outcome: Ongoing, Progressing  Goal: Optimal Comfort and Wellbeing  10/4/2023 1458 by Kizzy Guadarrama RN  Outcome: Ongoing, Progressing  10/4/2023 0723 by Kizzy Guadarrama RN  Outcome: Ongoing, Progressing  Goal: Readiness for Transition of Care  10/4/2023 1458 by Kizzy Guadarrama RN  Outcome: Ongoing, Progressing  10/4/2023 0723 by Kizzy Guadarrama RN  Outcome: Ongoing, Progressing     Problem:  Fall Injury Risk  Goal: Absence of Fall, Infant Drop and Related Injury  10/4/2023 1458 by Kizzy Guadarrama RN  Outcome: Ongoing, Progressing  10/4/2023 0723 by Kizzy Guadarrama RN  Outcome: Ongoing, Progressing     Problem: Infection  Goal: Absence of Infection Signs and Symptoms  10/4/2023 1458 by Kizzy Guadarrama RN  Outcome: Ongoing, Progressing  10/4/2023 0723 by Kizzy Guadarrama RN  Outcome: Ongoing, Progressing     Problem: Bleeding (Labor)  Goal: Hemostasis  10/4/2023 145 by Kizzy Guadarrama RN  Outcome: Ongoing, Progressing  10/4/2023 0723 by Kizzy Guadarrama RN  Outcome: Ongoing, Progressing     Problem: Change in Fetal Wellbeing (Labor)  Goal: Stable Fetal Wellbeing  10/4/2023 1458 by Kizzy Guadarrama RN  Outcome: Ongoing, Progressing  10/4/2023 0723 by Kizzy Guadarrama RN  Outcome: Ongoing, Progressing     Problem: Delayed Labor Progression (Labor)  Goal: Effective Progression to Delivery  10/4/2023 1458 by Thierry, Kizzy, RN  Outcome: Ongoing, Progressing  10/4/2023 0723 by Kizzy Guadarrama,  RN  Outcome: Ongoing, Progressing     Problem: Infection (Labor)  Goal: Absence of Infection Signs and Symptoms  10/4/2023 1458 by Kizzy Guadarrama RN  Outcome: Ongoing, Progressing  10/4/2023 0723 by Kizzy Guadarrama RN  Outcome: Ongoing, Progressing     Problem: Labor Pain (Labor)  Goal: Acceptable Pain Control  10/4/2023 1458 by Kizzy Guadarrama RN  Outcome: Ongoing, Progressing  10/4/2023 0723 by Kizzy Guadarrama RN  Outcome: Ongoing, Progressing     Problem: Uterine Tachysystole (Labor)  Goal: Normal Uterine Contraction Pattern  10/4/2023 1458 by Kizzy Guadarrama RN  Outcome: Ongoing, Progressing  10/4/2023 0723 by Kizzy Guadarrama RN  Outcome: Ongoing, Progressing     Problem: Adjustment to Role Transition (Postpartum  Delivery)  Goal: Successful Maternal Role Transition  Outcome: Ongoing, Progressing     Problem: Bleeding (Postpartum  Delivery)  Goal: Hemostasis  Outcome: Ongoing, Progressing     Problem: Infection (Postpartum  Delivery)  Goal: Absence of Infection Signs and Symptoms  Outcome: Ongoing, Progressing     Problem: Pain (Postpartum  Delivery)  Goal: Acceptable Pain Control  Outcome: Ongoing, Progressing     Problem: Postoperative Nausea and Vomiting (Postpartum  Delivery)  Goal: Nausea and Vomiting Relief  Outcome: Ongoing, Progressing     Problem: Postoperative Urinary Retention (Postpartum  Delivery)  Goal: Effective Urinary Elimination  Outcome: Ongoing, Progressing

## 2023-10-05 PROBLEM — Z34.03 ENCOUNTER FOR SUPERVISION OF NORMAL FIRST PREGNANCY IN THIRD TRIMESTER: Status: RESOLVED | Noted: 2023-10-04 | Resolved: 2023-10-05

## 2023-10-05 LAB
BASOPHILS # BLD AUTO: 0.03 K/UL (ref 0–0.2)
BASOPHILS NFR BLD AUTO: 0.2 % (ref 0–1)
DIFFERENTIAL METHOD BLD: ABNORMAL
EOSINOPHIL # BLD AUTO: 0 K/UL (ref 0–0.5)
EOSINOPHIL NFR BLD AUTO: 0 % (ref 1–4)
ERYTHROCYTE [DISTWIDTH] IN BLOOD BY AUTOMATED COUNT: 14.5 % (ref 11.5–14.5)
HCT VFR BLD AUTO: 30.2 % (ref 38–47)
HGB BLD-MCNC: 9.7 G/DL (ref 12–16)
IMM GRANULOCYTES # BLD AUTO: 0.15 K/UL (ref 0–0.04)
IMM GRANULOCYTES NFR BLD: 0.8 % (ref 0–0.4)
LYMPHOCYTES # BLD AUTO: 1.88 K/UL (ref 1–4.8)
LYMPHOCYTES NFR BLD AUTO: 9.8 % (ref 27–41)
MCH RBC QN AUTO: 27.2 PG (ref 27–31)
MCHC RBC AUTO-ENTMCNC: 32.1 G/DL (ref 32–36)
MCV RBC AUTO: 84.6 FL (ref 80–96)
MONOCYTES # BLD AUTO: 1.42 K/UL (ref 0–0.8)
MONOCYTES NFR BLD AUTO: 7.4 % (ref 2–6)
MPC BLD CALC-MCNC: 12.6 FL (ref 9.4–12.4)
NEUTROPHILS # BLD AUTO: 15.65 K/UL (ref 1.8–7.7)
NEUTROPHILS NFR BLD AUTO: 81.8 % (ref 53–65)
NRBC # BLD AUTO: 0 X10E3/UL
NRBC, AUTO (.00): 0 %
PLATELET # BLD AUTO: 67 K/UL (ref 150–400)
RBC # BLD AUTO: 3.57 M/UL (ref 4.2–5.4)
WBC # BLD AUTO: 19.13 K/UL (ref 4.5–11)

## 2023-10-05 PROCEDURE — 11000001 HC ACUTE MED/SURG PRIVATE ROOM

## 2023-10-05 PROCEDURE — 85025 COMPLETE CBC W/AUTO DIFF WBC: CPT | Performed by: STUDENT IN AN ORGANIZED HEALTH CARE EDUCATION/TRAINING PROGRAM

## 2023-10-05 PROCEDURE — 25000003 PHARM REV CODE 250: Performed by: STUDENT IN AN ORGANIZED HEALTH CARE EDUCATION/TRAINING PROGRAM

## 2023-10-05 PROCEDURE — 63600175 PHARM REV CODE 636 W HCPCS: Performed by: STUDENT IN AN ORGANIZED HEALTH CARE EDUCATION/TRAINING PROGRAM

## 2023-10-05 RX ORDER — OXYCODONE AND ACETAMINOPHEN 5; 325 MG/1; MG/1
1 TABLET ORAL EVERY 4 HOURS PRN
Qty: 20 EACH | Refills: 0 | Status: SHIPPED | OUTPATIENT
Start: 2023-10-05

## 2023-10-05 RX ORDER — IBUPROFEN 800 MG/1
800 TABLET ORAL EVERY 6 HOURS
Qty: 30 TABLET | Refills: 3 | Status: SHIPPED | OUTPATIENT
Start: 2023-10-05

## 2023-10-05 RX ADMIN — CEFAZOLIN 2 G: 2 INJECTION, POWDER, FOR SOLUTION INTRAMUSCULAR; INTRAVENOUS at 04:10

## 2023-10-05 RX ADMIN — OXYCODONE AND ACETAMINOPHEN 1 TABLET: 10; 325 TABLET ORAL at 08:10

## 2023-10-05 RX ADMIN — OXYCODONE AND ACETAMINOPHEN 1 TABLET: 10; 325 TABLET ORAL at 03:10

## 2023-10-05 RX ADMIN — Medication 1 TABLET: at 11:10

## 2023-10-05 RX ADMIN — DOCUSATE SODIUM 200 MG: 100 CAPSULE, LIQUID FILLED ORAL at 08:10

## 2023-10-05 RX ADMIN — IBUPROFEN 800 MG: 800 TABLET, FILM COATED ORAL at 12:10

## 2023-10-05 RX ADMIN — ACETAMINOPHEN 1000 MG: 500 TABLET ORAL at 06:10

## 2023-10-05 RX ADMIN — IBUPROFEN 800 MG: 800 TABLET, FILM COATED ORAL at 08:10

## 2023-10-05 RX ADMIN — SIMETHICONE 80 MG: 80 TABLET, CHEWABLE ORAL at 04:10

## 2023-10-05 RX ADMIN — IBUPROFEN 800 MG: 800 TABLET, FILM COATED ORAL at 06:10

## 2023-10-05 NOTE — ANESTHESIA POSTPROCEDURE EVALUATION
Anesthesia Post Evaluation    Patient: Dominique Tomlin    Procedure(s) Performed: Procedure(s) (LRB):   SECTION (N/A)    Final Anesthesia Type: general      Patient location during evaluation: labor & delivery  Patient participation: Yes- Able to Participate  Level of consciousness: awake and alert  Post-procedure vital signs: reviewed and stable  Pain management: adequate  Airway patency: patent  NACHO mitigation strategies: Multimodal analgesia  PONV status at discharge: No PONV  Anesthetic complications: no      Cardiovascular status: blood pressure returned to baseline  Respiratory status: unassisted  Hydration status: euvolemic  Follow-up not needed.          Vitals Value Taken Time   /63 10/05/23 1103   Temp 35.9 °C (96.6 °F) 10/05/23 1103   Pulse 100 10/05/23 1103   Resp 20 10/05/23 0754   SpO2 98 % 10/05/23 0754   Vitals shown include unvalidated device data.      Event Time   Out of Recovery 10/04/2023 18:00:00         Pain/Ginette Score: Pain Rating Prior to Med Admin: 4 (10/5/2023 11:11 AM)  Pain Rating Post Med Admin: 1 (10/4/2023  5:42 PM)  Ginette Score: 9 (10/4/2023  1:53 PM)

## 2023-10-05 NOTE — PLAN OF CARE
Problem: Adult Inpatient Plan of Care  Goal: Plan of Care Review  Outcome: Ongoing, Progressing  Goal: Patient-Specific Goal (Individualized)  Outcome: Ongoing, Progressing  Goal: Absence of Hospital-Acquired Illness or Injury  Outcome: Ongoing, Progressing  Goal: Optimal Comfort and Wellbeing  Outcome: Ongoing, Progressing  Goal: Readiness for Transition of Care  Outcome: Ongoing, Progressing     Problem:  Fall Injury Risk  Goal: Absence of Fall, Infant Drop and Related Injury  Outcome: Ongoing, Progressing     Problem: Infection  Goal: Absence of Infection Signs and Symptoms  Outcome: Ongoing, Progressing     Problem: Adjustment to Role Transition (Postpartum  Delivery)  Goal: Successful Maternal Role Transition  Outcome: Ongoing, Progressing     Problem: Bleeding (Postpartum  Delivery)  Goal: Hemostasis  Outcome: Ongoing, Progressing     Problem: Infection (Postpartum  Delivery)  Goal: Absence of Infection Signs and Symptoms  Outcome: Ongoing, Progressing     Problem: Pain (Postpartum  Delivery)  Goal: Acceptable Pain Control  Outcome: Ongoing, Progressing     Problem: Postoperative Nausea and Vomiting (Postpartum  Delivery)  Goal: Nausea and Vomiting Relief  Outcome: Ongoing, Progressing     Problem: Postoperative Urinary Retention (Postpartum  Delivery)  Goal: Effective Urinary Elimination  Outcome: Ongoing, Progressing     Problem: Bleeding Risk or Actual (Thrombocytopenia)  Goal: Absence of Bleeding  Outcome: Ongoing, Progressing

## 2023-10-05 NOTE — PROGRESS NOTES
[facility] -  Labor and Delivery  Obstetrics  Postpartum Progress Note    Patient Name: Dominique Tomlin  MRN: [id number]  Admission Date: 10/3/2023  Hospital Length of Stay: 2 days  Attending Physician: Jermaine Sosa DO  Primary Care Provider: Anita Celis MD    Subjective:     Principal Problem:Idiopathic thrombocytopenic purpura (ITP)    Hospital course: PCS on 10/4/23, general anesthesia, per Dr Sosa    Interval History: Postop Day 1    She is doing well this morning. She is tolerating a regular diet without nausea or vomiting. She is not voiding spontaneously. She is ambulating. She has not passed flatus, and has not a BM. Vaginal bleeding is mild. She denies fever or chills. Abdominal pain is mild and controlled with oral medications. She Is not breastfeeding. She desires circumcision for her male baby: not applicable.     Objective:     Vital Signs (Most Recent):  Temp: 97 °F (36.1 °C) (10/04/23 1946)  Pulse: 70 (10/05/23 0422)  Resp: 18 (10/04/23 1946)  BP: (!) 85/51 (10/05/23 0422)  SpO2: 98 % (10/04/23 2336) Vital Signs (24h Range):  Temp:  [97 °F (36.1 °C)] 97 °F (36.1 °C)  Pulse:  [56-92] 70  Resp:  [15-18] 18  SpO2:  [92 %-100 %] 98 %  BP: ()/(41-85) 85/51     Weight: 77 kg (169 lb 12.8 oz)  Body mass index is 34.3 kg/m².      Intake/Output Summary (Last 24 hours) at 10/5/2023 0716  Last data filed at 10/4/2023 1339  Gross per 24 hour   Intake --   Output 900 ml   Net -900 ml       Physical Exam:   Constitutional: She is oriented to person, place, and time. She appears well-developed.    HENT:   Head: Normocephalic.      Cardiovascular:  Normal rate and regular rhythm.             Pulmonary/Chest: Effort normal and breath sounds normal.        Abdominal: Soft. Bowel sounds are normal. She exhibits abdominal incision (well-approximated, no s/s infection). She exhibits no distension. There is no abdominal tenderness.             Musculoskeletal: No edema.        Neurological: She is alert and oriented to person, place, and time.    Skin: Skin is warm and dry.    Psychiatric: She has a normal mood and affect.       Significant Labs:  Lab Results   Component Value Date    GROUPTRH O POS 10/03/2023    HEPBSAG Non-Reactive 10/03/2023     Recent Labs   Lab 10/05/23  0541   HGB 9.7*   HCT 30.2*       CBC:   Recent Labs   Lab 10/05/23  0541   WBC 19.13*   RBC 3.57*   HGB 9.7*   HCT 30.2*   PLT 67*   MCV 84.6   MCH 27.2   MCHC 32.1     I have personallly reviewed all pertinent lab results from the last 24 hours.    Assessment/Plan:     28 y.o. female  at 39w4d for:    Active Diagnoses:    Diagnosis Date Noted POA    PRINCIPAL PROBLEM:  Idiopathic thrombocytopenic purpura (ITP) [D69.3] 10/04/2023 Yes    Encounter for supervision of normal first pregnancy in third trimester [Z34.03] 10/04/2023 Not Applicable    39 weeks gestation of pregnancy [Z3A.39] 10/04/2023 Not Applicable      Problems Resolved During this Admission:       Routine postop care    Disposition: As patient meets milestones, will plan to discharge tomorrow.    Nunu Chandra CNM  Obstetrics  [facility] -  Labor and Delivery

## 2023-10-05 NOTE — DISCHARGE SUMMARY
[facility] -  Labor and Delivery  Obstetrics  Discharge Summary      Patient Name: Dominique Tomlin  MRN: [id number]  Admission Date: 10/3/2023  Hospital Length of Stay: 2 days  Discharge Date and Time: 10/6/2023 11:30 AM  Attending Physician: Jermaine Sosa DO   Discharging Provider: Nunu Chandra CNM  Primary Care Provider: Anita Celis MD    HPI: Pt is awake and alert with no s/s distress.  Tolerating ambulation and regular diet.  Pain is well controlled with prn meds.  Voiding and passing gas.  Abd soft, non-tender. Incision well approximated with no s/s infection. Small amount lochia rubra. No edema lower extrem.       Procedure(s) (LRB):   SECTION (N/A)     Hospital Course: PCS per Dr Sosa on 10/4/23, general anesthesia.  Postpartum course has been uncomplicated.  Infant is doing well and expected to be discharged home with mom.         Final Active Diagnoses:    Diagnosis Date Noted POA    PRINCIPAL PROBLEM:  Delivery of pregnancy by  section [O82] 10/05/2023 No    Postpartum care following  delivery [Z39.2] 10/05/2023 Not Applicable    Idiopathic thrombocytopenic purpura (ITP) [D69.3] 10/04/2023 Yes    39 weeks gestation of pregnancy [Z3A.39] 10/04/2023 Not Applicable      Problems Resolved During this Admission:    Diagnosis Date Noted Date Resolved POA    Encounter for supervision of normal first pregnancy in third trimester [Z34.03] 10/04/2023 10/05/2023 Not Applicable        Labs: CBC   Recent Labs   Lab 10/03/23  2056 10/04/23  0632 10/05/23  0541   WBC 9.30 10.59 19.13*   HGB 11.1* 11.3* 9.7*   HCT 34.5* 35.2* 30.2*   PLT 58* 57* 67*       Feeding Method: bottle    Immunizations       Date Immunization Status Dose Route/Site Given by    10/04/23 1459 MMR Incomplete 0.5 mL Subcutaneous/     10/04/23 1459 Tdap Incomplete 0.5 mL Intramuscular/             Delivery:    Episiotomy: None   Lacerations: None   Repair suture: None   Repair # of packets:    "  Blood loss (ml):       Birth information:  YOB: 2021   Time of birth: 1:05 PM   Sex: female   Delivery type: , Low Transverse   Gestational Age: 39w4d     Measurements    Weight: 2897 g  Weight (lbs): 6 lb 6.2 oz  Length: 45.7 cm  Length (in): 18"         Delivery Clinician: Delivery Providers    Delivering clinician: Jermaine Sosa DO          Additional  information:  Forceps:    Vacuum:    Breech:    Observed anomalies      Living?:     Apgars    Living status: Living  Apgar Component Scores:  1 min.:  5 min.:  10 min.:  15 min.:  20 min.:    Skin color:  0  1       Heart rate:  1  1       Reflex irritability:  2  2       Muscle tone:  2  2       Respiratory effort:  2  2       Total:  7  8                Placenta: Delivered:       appearance  Pending Diagnostic Studies:       Procedure Component Value Units Date/Time    EXTRA TUBES [[id number]] Collected: 10/03/23 2100    Order Status: Sent Lab Status: In process Updated: 10/03/23 2059    Specimen: Blood, Venous     Narrative:      The following orders were created for panel order EXTRA TUBES.  Procedure                               Abnormality         Status                     ---------                               -----------         ------                     Pink Top Hold[[id number]]                                   In process                   Please view results for these tests on the individual orders.            Discharged Condition: good    Disposition: Home or Self Care    Follow Up:   Follow-up Information       Nunu Chandra CNM Follow up in 1 week(s).    Specialty: Obstetrics and Gynecology  Why: Incision Check  Contact information:  [address] [facility] [address]  [phone]                           Patient Instructions:      Pelvic Rest   Order Comments: 6 weeks     No driving until:   Order Comments: 1-2 weeks     Lifting restrictions   Order Comments: 10 pounds     Notify your health care provider if " you experience any of the following:  increased confusion or weakness     Notify your health care provider if you experience any of the following:  persistent dizziness, light-headedness, or visual disturbances     Notify your health care provider if you experience any of the following:  worsening rash     Notify your health care provider if you experience any of the following:  severe persistent headache     Notify your health care provider if you experience any of the following:  redness, tenderness, or signs of infection (pain, swelling, redness, odor or green/yellow discharge around incision site)     Notify your health care provider if you experience any of the following:  severe uncontrolled pain     Notify your health care provider if you experience any of the following:  persistent nausea and vomiting or diarrhea     Notify your health care provider if you experience any of the following:  temperature >100.4     Notify your health care provider if you experience any of the following:  difficulty breathing or increased cough     No dressing needed     Medications:  Current Discharge Medication List        START taking these medications    Details   ibuprofen (ADVIL,MOTRIN) 800 MG tablet Take 1 tablet (800 mg total) by mouth every 6 (six) hours.  Qty: 30 tablet, Refills: 3      oxyCODONE-acetaminophen (PERCOCET) 5-325 mg per tablet Take 1 tablet by mouth every 4 (four) hours as needed for Pain.  Qty: 20 each, Refills: 0    Comments: n/a            CONTINUE these medications which have NOT CHANGED    Details   prenatal vit no.124/iron/folic (PRENATAL VITAMIN ORAL) Take 1 tablet by mouth once daily.      clindamycin (CLEOCIN T) 1 % Swab Apply topically 2 (two) times daily.  Qty: 120 each, Refills: 11    Associated Diagnoses: Acne vulgaris           STOP taking these medications       amoxicillin (AMOXIL) 500 MG capsule Comments:   Reason for Stopping:               Nunu Chandra CNM  Obstetrics  [facility] -  Labor and Delivery

## 2023-10-06 VITALS
WEIGHT: 169.81 LBS | BODY MASS INDEX: 34.23 KG/M2 | TEMPERATURE: 98 F | RESPIRATION RATE: 16 BRPM | OXYGEN SATURATION: 99 % | HEART RATE: 81 BPM | SYSTOLIC BLOOD PRESSURE: 99 MMHG | DIASTOLIC BLOOD PRESSURE: 56 MMHG | HEIGHT: 59 IN

## 2023-10-06 PROCEDURE — 25000003 PHARM REV CODE 250: Performed by: STUDENT IN AN ORGANIZED HEALTH CARE EDUCATION/TRAINING PROGRAM

## 2023-10-06 RX ADMIN — IBUPROFEN 800 MG: 800 TABLET, FILM COATED ORAL at 10:10

## 2023-10-06 RX ADMIN — Medication 1 TABLET: at 09:10

## 2023-10-06 RX ADMIN — DOCUSATE SODIUM 200 MG: 100 CAPSULE, LIQUID FILLED ORAL at 09:10

## 2023-10-06 NOTE — NURSING
Written discharge instructions given to patient. Patient states understanding of verbal teaching. Patient placed in wheelchair for discharge. Infant placed in carrier. Infant cord clamped off by nursery. Patient discharged home.

## 2023-10-06 NOTE — PLAN OF CARE
Problem: Adult Inpatient Plan of Care  Goal: Plan of Care Review  Outcome: Met  Goal: Patient-Specific Goal (Individualized)  Outcome: Met  Goal: Absence of Hospital-Acquired Illness or Injury  Outcome: Met  Goal: Optimal Comfort and Wellbeing  Outcome: Met  Goal: Readiness for Transition of Care  Outcome: Met     Problem:  Fall Injury Risk  Goal: Absence of Fall, Infant Drop and Related Injury  Outcome: Met     Problem: Infection  Goal: Absence of Infection Signs and Symptoms  Outcome: Met     Problem: Adjustment to Role Transition (Postpartum  Delivery)  Goal: Successful Maternal Role Transition  Outcome: Met     Problem: Bleeding (Postpartum  Delivery)  Goal: Hemostasis  Outcome: Met     Problem: Infection (Postpartum  Delivery)  Goal: Absence of Infection Signs and Symptoms  Outcome: Met     Problem: Pain (Postpartum  Delivery)  Goal: Acceptable Pain Control  Outcome: Met     Problem: Postoperative Nausea and Vomiting (Postpartum  Delivery)  Goal: Nausea and Vomiting Relief  Outcome: Met     Problem: Postoperative Urinary Retention (Postpartum  Delivery)  Goal: Effective Urinary Elimination  Outcome: Met     Problem: Bleeding Risk or Actual (Thrombocytopenia)  Goal: Absence of Bleeding  Outcome: Met

## 2023-10-06 NOTE — DISCHARGE INSTRUCTIONS
Verbal and written discharge teaching. Patient states understanding of teaching. No questions or complaints at time of discharge.

## 2023-10-11 ENCOUNTER — POSTPARTUM VISIT (OUTPATIENT)
Dept: OBSTETRICS AND GYNECOLOGY | Facility: CLINIC | Age: 28
End: 2023-10-11
Payer: COMMERCIAL

## 2023-10-11 VITALS
SYSTOLIC BLOOD PRESSURE: 113 MMHG | BODY MASS INDEX: 31.65 KG/M2 | HEIGHT: 59 IN | HEART RATE: 80 BPM | TEMPERATURE: 97 F | DIASTOLIC BLOOD PRESSURE: 86 MMHG | WEIGHT: 157 LBS | RESPIRATION RATE: 16 BRPM

## 2023-10-11 DIAGNOSIS — D69.6 THROMBOCYTOPENIA: ICD-10-CM

## 2023-10-11 DIAGNOSIS — Z09 POSTOP CHECK: Primary | ICD-10-CM

## 2023-10-11 DIAGNOSIS — Z98.891 STATUS POST CESAREAN SECTION: ICD-10-CM

## 2023-10-11 DIAGNOSIS — Z30.09 GENERAL COUNSELING AND ADVICE ON CONTRACEPTIVE MANAGEMENT: ICD-10-CM

## 2023-10-11 PROCEDURE — 0503F PR POSTPARTUM CARE VISIT: ICD-10-PCS | Mod: ,,, | Performed by: ADVANCED PRACTICE MIDWIFE

## 2023-10-11 PROCEDURE — 99213 OFFICE O/P EST LOW 20 MIN: CPT | Mod: PBBFAC | Performed by: ADVANCED PRACTICE MIDWIFE

## 2023-10-11 PROCEDURE — 0503F POSTPARTUM CARE VISIT: CPT | Mod: ,,, | Performed by: ADVANCED PRACTICE MIDWIFE

## 2023-10-11 NOTE — PROGRESS NOTES
"Subjective:       Dominique Tomlin is a 28 y.o. female who presents to the clinic 1 weeks status post  for  primary c section . Eating a regular diet without difficulty. Bowel movements are normal.  Pain is controlled with medication.     The following portions of the patient's history were reviewed and updated as appropriate: allergies, current medications, past family history, past medical history, past social history, past surgical history, and problem list.    Review of Systems  Pertinent items are noted in HPI.      Objective:      /86   Pulse 80   Temp 97.3 °F (36.3 °C)   Resp 16   Ht 4' 11" (1.499 m)   Wt 71.2 kg (157 lb)   LMP 2022 (Exact Date)   Breastfeeding Yes   BMI 31.71 kg/m²   General:  alert, appears stated age, and cooperative   Abdomen: soft, bowel sounds active, non-tender   Incision:   healing well, no drainage, no erythema, no hernia, no seroma, no swelling, no dehiscence, incision well approximated       Assessment:   Post  Delivery   Doing well postoperatively.  Lactating Mother  Thrombocytopenia     Plan:      1. Continue any current medications.  2. Wound care discussed.  3. Activity restrictions: no lifting more than 10 pounds  4. Anticipated return to work: not applicable.  5. Follow up: 5 weeks for post partum.   Labs today    "

## 2023-10-25 ENCOUNTER — PATIENT MESSAGE (OUTPATIENT)
Dept: OBSTETRICS AND GYNECOLOGY | Facility: CLINIC | Age: 28
End: 2023-10-25
Payer: COMMERCIAL

## 2023-11-16 ENCOUNTER — POSTPARTUM VISIT (OUTPATIENT)
Dept: OBSTETRICS AND GYNECOLOGY | Facility: CLINIC | Age: 28
End: 2023-11-16
Payer: COMMERCIAL

## 2023-11-16 VITALS
BODY MASS INDEX: 31.45 KG/M2 | WEIGHT: 156 LBS | HEART RATE: 78 BPM | RESPIRATION RATE: 18 BRPM | HEIGHT: 59 IN | OXYGEN SATURATION: 97 % | SYSTOLIC BLOOD PRESSURE: 120 MMHG | DIASTOLIC BLOOD PRESSURE: 73 MMHG

## 2023-11-16 DIAGNOSIS — Z30.09 GENERAL COUNSELING AND ADVICE ON CONTRACEPTIVE MANAGEMENT: ICD-10-CM

## 2023-11-16 DIAGNOSIS — Z98.891 STATUS POST CESAREAN SECTION: ICD-10-CM

## 2023-11-16 PROCEDURE — 0503F POSTPARTUM CARE VISIT: CPT | Mod: CPTII,,, | Performed by: ADVANCED PRACTICE MIDWIFE

## 2023-11-16 PROCEDURE — 0503F PR POSTPARTUM CARE VISIT: ICD-10-PCS | Mod: CPTII,,, | Performed by: ADVANCED PRACTICE MIDWIFE

## 2023-11-16 PROCEDURE — 99214 OFFICE O/P EST MOD 30 MIN: CPT | Mod: PBBFAC | Performed by: ADVANCED PRACTICE MIDWIFE

## 2023-11-16 NOTE — PROGRESS NOTES
"Subjective:       Dominique Tomlin is a 28 y.o. female who presents for a postpartum visit. She is 6 weeks postpartum following a low cervical transverse  section. I have fully reviewed the prenatal and intrapartum course. The delivery was at 39 gestational weeks. Outcome: primary  section, low transverse incision. Anesthesia: general. Postpartum course has been uncomplicated. Baby's course has been uncomplicated. Baby is feeding by breast. Bleeding no bleeding. Bowel function is normal. Bladder function is normal. Patient is not sexually active. Contraception method is abstinence. Postpartum depression screening: negative. Last pap 2023 WNL    The following portions of the patient's history were reviewed and updated as appropriate: allergies, current medications, past family history, past medical history, past social history, past surgical history, and problem list.    Review of Systems  Pertinent items are noted in HPI.     Objective:      /73 (BP Location: Left arm, Patient Position: Sitting)   Pulse 78   Resp 18   Ht 4' 11" (1.499 m)   Wt 70.8 kg (156 lb)   LMP 2022 (Exact Date)   SpO2 97%   Breastfeeding Yes   BMI 31.51 kg/m²    General:  alert, appears stated age, and cooperative    Breasts:  deferred   Lungs: clear to auscultation bilaterally   Heart:  regular rate and rhythm   Abdomen: soft, non-tender; bowel sounds normal; no masses,  no organomegaly                                  Assessment:      6 week postpartum exam. Pap smear not done at today's visit.     Plan:      1. Contraception:  natural family planning  2.  Follow up in: 1  year  or as needed.     "

## 2024-09-16 PROBLEM — Z3A.39 39 WEEKS GESTATION OF PREGNANCY: Status: RESOLVED | Noted: 2023-10-04 | Resolved: 2024-09-16

## 2024-09-19 DIAGNOSIS — Z32.01 POSITIVE PREGNANCY TEST: Primary | ICD-10-CM

## 2024-09-30 ENCOUNTER — HOSPITAL ENCOUNTER (OUTPATIENT)
Dept: RADIOLOGY | Facility: HOSPITAL | Age: 29
Discharge: HOME | End: 2024-09-30
Attending: ADVANCED PRACTICE MIDWIFE
Payer: COMMERCIAL

## 2024-09-30 ENCOUNTER — OFFICE VISIT (OUTPATIENT)
Dept: OBSTETRICS AND GYNECOLOGY | Facility: CLINIC | Age: 29
End: 2024-09-30
Payer: COMMERCIAL

## 2024-09-30 VITALS
HEIGHT: 59 IN | DIASTOLIC BLOOD PRESSURE: 78 MMHG | BODY MASS INDEX: 29.84 KG/M2 | OXYGEN SATURATION: 100 % | SYSTOLIC BLOOD PRESSURE: 118 MMHG | WEIGHT: 148 LBS | RESPIRATION RATE: 18 BRPM | HEART RATE: 80 BPM

## 2024-09-30 DIAGNOSIS — Z3A.01 LESS THAN 8 WEEKS GESTATION OF PREGNANCY: ICD-10-CM

## 2024-09-30 DIAGNOSIS — O30.011 MONOAMNIOTIC AND MONOCHORIONIC TWIN GESTATION IN FIRST TRIMESTER: ICD-10-CM

## 2024-09-30 DIAGNOSIS — Z86.2 HISTORY OF ITP: ICD-10-CM

## 2024-09-30 DIAGNOSIS — N91.1 SECONDARY AMENORRHEA: Primary | ICD-10-CM

## 2024-09-30 DIAGNOSIS — Z11.3 SCREENING FOR STD (SEXUALLY TRANSMITTED DISEASE): ICD-10-CM

## 2024-09-30 DIAGNOSIS — O34.219 PREVIOUS CESAREAN DELIVERY AFFECTING PREGNANCY: ICD-10-CM

## 2024-09-30 DIAGNOSIS — Z32.01 POSITIVE PREGNANCY TEST: ICD-10-CM

## 2024-09-30 DIAGNOSIS — E55.9 VITAMIN D DEFICIENCY: ICD-10-CM

## 2024-09-30 PROCEDURE — 0501F PRENATAL FLOW SHEET: CPT | Mod: S$GLB,,, | Performed by: ADVANCED PRACTICE MIDWIFE

## 2024-09-30 PROCEDURE — 76817 TRANSVAGINAL US OBSTETRIC: CPT | Mod: TC

## 2024-09-30 PROCEDURE — 76801 OB US < 14 WKS SINGLE FETUS: CPT | Mod: TC

## 2024-09-30 PROCEDURE — 87086 URINE CULTURE/COLONY COUNT: CPT | Mod: ,,, | Performed by: CLINICAL MEDICAL LABORATORY

## 2024-09-30 PROCEDURE — 87661 TRICHOMONAS VAGINALIS AMPLIF: CPT | Mod: ,,, | Performed by: CLINICAL MEDICAL LABORATORY

## 2024-09-30 PROCEDURE — 87491 CHLMYD TRACH DNA AMP PROBE: CPT | Mod: ,,, | Performed by: CLINICAL MEDICAL LABORATORY

## 2024-09-30 PROCEDURE — 87591 N.GONORRHOEAE DNA AMP PROB: CPT | Mod: ,,, | Performed by: CLINICAL MEDICAL LABORATORY

## 2024-09-30 PROCEDURE — 99999 PR PBB SHADOW E&M-EST. PATIENT-LVL V: CPT | Mod: PBBFAC,,, | Performed by: ADVANCED PRACTICE MIDWIFE

## 2024-09-30 PROCEDURE — 1159F MED LIST DOCD IN RCRD: CPT | Mod: S$GLB,,, | Performed by: ADVANCED PRACTICE MIDWIFE

## 2024-09-30 PROCEDURE — 76802 OB US < 14 WKS ADDL FETUS: CPT | Mod: TC

## 2024-09-30 PROCEDURE — 3008F BODY MASS INDEX DOCD: CPT | Mod: S$GLB,,, | Performed by: ADVANCED PRACTICE MIDWIFE

## 2024-09-30 PROCEDURE — 3074F SYST BP LT 130 MM HG: CPT | Mod: S$GLB,,, | Performed by: ADVANCED PRACTICE MIDWIFE

## 2024-09-30 PROCEDURE — 3078F DIAST BP <80 MM HG: CPT | Mod: S$GLB,,, | Performed by: ADVANCED PRACTICE MIDWIFE

## 2024-09-30 NOTE — PROGRESS NOTES
Initial OB Visit    Subjective:      Dominique Tomlin is being seen today for her first obstetrical visit.  This is not a planned pregnancy. She is at  6w 4d gestation. Her obstetrical history is significant for  previous c section x1, ITP  . Relationship with FOB: spouse, living together. Patient does intend to breast feed. C/O mild nausea, no vomiting at this time. Denies vaginal bleeding, abd pain or cramping.  Recently stopped breastfeeding.    Pregnancy history reviewed.  Problem list updated.    Menstrual History:  OB History    Para Term  AB Living   2 1 1 0 0 1   SAB IAB Ectopic Multiple Live Births   0 0 0 0 1      # Outcome Date GA Lbr [place]/2nd Weight Sex Type Anes PTL Lv   2 Current            1 Term 10/04/23 39w4d  2.897 kg (6 lb 6.2 oz) F CS-LTranv Gen N SERA        Patient's last menstrual period was 2024.  EDC by LMP 5/15/25  US today 6w4d with 25 (FINAL)    Ultrasound today  FINDINGS:  There is a monochorionic, monoamniotic twin gestation present.  Two fetal poles and yolk sacs are seen.  The crown-rump length for twin A is 0.7 cm which corresponds to a menstrual age of 6 weeks 4 days +/- 5 days.  The EDC is May 22, 2025.  The crown multiple for twin B is 0.7 cm which corresponds to a menstrual age of 6 weeks 4 days +/-5 days.  The EDC is May 22, 2025.  Cardiac activity for twin A was documented at 126 BPM and for twin B, 131 BPM.  The cervix is normal in length,  measuring 3.4 cm.  The right ovary measures 3.3 x 2.1 x 2.0 cm.  The left ovary measures 3.0 x 1.1 x 1.2 cm.     Impression:  There is a mono chorionic mono amniotic twin gestation present.  Twin A - 6 weeks 4 days +/-5 days  EDC -  May 22, 2025  Twin B -  6 weeks 4 days +/-5 days  EDC -  May 22, 2025     Past Medical History:   Diagnosis Date    Acne     Idiopathic thrombocytopenic purpura (ITP)       Past Surgical History:   Procedure Laterality Date     SECTION N/A 10/4/2023    Procedure:  SECTION;   "Surgeon: Jermaine Sosa DO;  Location: [facility] L&D;  Service: OB/GYN;  Laterality: N/A;      Current Outpatient Medications   Medication Instructions    clindamycin (CLEOCIN T) 1 % Swab Topical (Top), 2 times daily    ibuprofen (ADVIL,MOTRIN) 800 mg, Oral, Every 6 hours    oxyCODONE-acetaminophen (PERCOCET) 5-325 mg per tablet 1 tablet, Oral, Every 4 hours PRN    prenatal vit no.124/iron/folic (PRENATAL VITAMIN ORAL) 1 tablet, Daily      The following portions of the patient's history were reviewed and updated as appropriate: allergies, current medications, past family history, past medical history, past social history, past surgical history, and problem list.    Review of Systems  Pertinent items are noted in HPI.      Objective:      /78 (BP Location: Left arm, Patient Position: Sitting) Comment: Simultaneous filing. User may not have seen previous data.  Pulse 80   Resp 18   Ht 4' 11" (1.499 m)   Wt 67.1 kg (148 lb)   LMP 08/08/2024 Comment: irregular  SpO2 100%   Breastfeeding No   BMI 29.89 kg/m²   General appearance: alert, appears stated age, cooperative, and no distress  Head: Normocephalic, without obvious abnormality, atraumatic  Lungs: clear to auscultation bilaterally and even/unlabored  Heart: regular rate and rhythm  Abdomen: soft, non-tender  Extremities: extremities normal, atraumatic, no cyanosis or edema  Skin: Skin color, texture, turgor normal. No rashes or lesions      Assessment:      1. Secondary amenorrhea    2. Positive pregnancy test    3. Screening for STD (sexually transmitted disease)  - Trichomonas vaginalis by PCR  - Hepatitis C Antibody; Future  - Chlamydia/GC, PCR  - HIV 1/2 Ag/Ab (4th Gen); Future  - Treponema Pallidum (Syphillis) Antibody; Future  - Hepatitis B Surface Antigen; Future    4. Less than 8 weeks gestation of pregnancy    5. Monoamniotic and monochorionic twin gestation in first trimester  - Varicella Zoster Antibody, IgG; Future  - Trichomonas " vaginalis by PCR  - Hepatitis C Antibody; Future  - OB Drug Screen Definitive, Urine; Future  - Chlamydia/GC, PCR  - HIV 1/2 Ag/Ab (4th Gen); Future  - Type & Screen; Future  - Urine culture  - Treponema Pallidum (Syphillis) Antibody; Future  - Rubella Antibody Screen; Future  - Hepatitis B Surface Antigen; Future  - Basic Metabolic Panel; Future  - CBC Auto Differential; Future  - Vitamin D; Future  - OB Drug Screen Definitive, Urine  - US OB Follow-up Ea Gestation Transabd; Future  - Ambulatory referral/consult to Perinatology; Future    6. History of ITP  - Ambulatory referral/consult to Perinatology; Future    7. Previous  delivery affecting pregnancy  - Ambulatory referral/consult to Perinatology; Future    8. Vitamin D deficiency  - Vitamin D; Future     Plan:   Plan MFM consult. Contacted QASIM Heaton for recommendation and she would like to see patient at 10-12wk gestation for evaluation.   Initial labs drawn.  Alonso Screen discussed.  Prenatal vitamins daily  New OB booklet given for pt to review.    Discussed midwifery care in collaboration with Dr Sosa and Dr Lizama.    Reviewed healthy diet, exercise during pregnancy and weight gain recommendations.    Safe OTC med list given and reviewed.    Discussed danger s/s to report including bleeding precautions.      Breastfeeding  - Discussed benefits of breastfeeding for mother and baby and limitations of formula  - Educated mother on milk and milk production  - Encouraged to feed infant on demand  - Needs 8-12 feeds in 24 hrs  - Does not need to go more then 4-5 hours with out a feed  - Reviewed feeding cues, feeding patterns and positions  - Encouraged patient to review pregnancy guide for additional information    Encouraged [employer] Access    Follow up in about 4 weeks (around 10/28/2024) for OBV, Ultrasound.

## 2024-10-01 LAB
CHLAMYDIA BY PCR: NEGATIVE
N. GONORRHOEAE (GC) BY PCR: NEGATIVE
TRICHOMONAS NAT: NEGATIVE

## 2024-10-02 LAB — UA COMPLETE W REFLEX CULTURE PNL UR: NORMAL

## 2024-10-07 ENCOUNTER — PATIENT MESSAGE (OUTPATIENT)
Dept: OBSTETRICS AND GYNECOLOGY | Facility: CLINIC | Age: 29
End: 2024-10-07
Payer: COMMERCIAL

## 2024-10-28 ENCOUNTER — HOSPITAL ENCOUNTER (OUTPATIENT)
Dept: RADIOLOGY | Facility: HOSPITAL | Age: 29
Discharge: HOME | End: 2024-10-28
Attending: ADVANCED PRACTICE MIDWIFE
Payer: COMMERCIAL

## 2024-10-28 ENCOUNTER — ROUTINE PRENATAL (OUTPATIENT)
Dept: OBSTETRICS AND GYNECOLOGY | Facility: CLINIC | Age: 29
End: 2024-10-28
Attending: ADVANCED PRACTICE MIDWIFE
Payer: COMMERCIAL

## 2024-10-28 VITALS
SYSTOLIC BLOOD PRESSURE: 110 MMHG | DIASTOLIC BLOOD PRESSURE: 70 MMHG | WEIGHT: 146 LBS | HEART RATE: 80 BPM | BODY MASS INDEX: 29.49 KG/M2

## 2024-10-28 DIAGNOSIS — R35.0 URINARY FREQUENCY: ICD-10-CM

## 2024-10-28 DIAGNOSIS — O34.219 PREVIOUS CESAREAN DELIVERY AFFECTING PREGNANCY: ICD-10-CM

## 2024-10-28 DIAGNOSIS — O30.011 MONOAMNIOTIC AND MONOCHORIONIC TWIN GESTATION IN FIRST TRIMESTER: Primary | ICD-10-CM

## 2024-10-28 DIAGNOSIS — Z86.2 HISTORY OF ITP: ICD-10-CM

## 2024-10-28 DIAGNOSIS — O30.011 MONOAMNIOTIC AND MONOCHORIONIC TWIN GESTATION IN FIRST TRIMESTER: ICD-10-CM

## 2024-10-28 DIAGNOSIS — Z3A.10 10 WEEKS GESTATION OF PREGNANCY: ICD-10-CM

## 2024-10-28 LAB
BILIRUB SERPL-MCNC: NORMAL MG/DL
BLOOD URINE, POC: NORMAL
CLARITY, UA: CLEAR
COLOR, UA: COLORLESS
GLUCOSE UR QL STRIP: NORMAL
KETONES UR QL STRIP: NORMAL
LEUKOCYTE ESTERASE URINE, POC: NORMAL
NITRITE, POC UA: NORMAL
PH, POC UA: 6.5
PROTEIN, POC: NORMAL
SPECIFIC GRAVITY, POC UA: 1.02
UROBILINOGEN, POC UA: NORMAL

## 2024-10-28 PROCEDURE — 76816 OB US FOLLOW-UP PER FETUS: CPT | Mod: 26,,, | Performed by: RADIOLOGY

## 2024-10-28 PROCEDURE — 99999 PR PBB SHADOW E&M-EST. PATIENT-LVL III: CPT | Mod: PBBFAC,,, | Performed by: ADVANCED PRACTICE MIDWIFE

## 2024-10-28 PROCEDURE — 76816 OB US FOLLOW-UP PER FETUS: CPT | Mod: 26,59,, | Performed by: RADIOLOGY

## 2024-10-28 PROCEDURE — 76816 OB US FOLLOW-UP PER FETUS: CPT | Mod: TC

## 2024-10-28 PROCEDURE — 0502F SUBSEQUENT PRENATAL CARE: CPT | Mod: S$GLB,,, | Performed by: ADVANCED PRACTICE MIDWIFE

## 2024-10-31 DIAGNOSIS — O30.011 MONOAMNIOTIC AND MONOCHORIONIC TWIN GESTATION IN FIRST TRIMESTER: Primary | ICD-10-CM

## 2024-11-25 ENCOUNTER — HOSPITAL ENCOUNTER (OUTPATIENT)
Dept: RADIOLOGY | Facility: HOSPITAL | Age: 29
Discharge: HOME | End: 2024-11-25
Attending: ADVANCED PRACTICE MIDWIFE
Payer: COMMERCIAL

## 2024-11-25 ENCOUNTER — ROUTINE PRENATAL (OUTPATIENT)
Dept: OBSTETRICS AND GYNECOLOGY | Facility: CLINIC | Age: 29
End: 2024-11-25
Payer: COMMERCIAL

## 2024-11-25 VITALS
BODY MASS INDEX: 29.69 KG/M2 | HEART RATE: 78 BPM | WEIGHT: 147 LBS | SYSTOLIC BLOOD PRESSURE: 110 MMHG | DIASTOLIC BLOOD PRESSURE: 70 MMHG

## 2024-11-25 DIAGNOSIS — O34.219 PREVIOUS CESAREAN DELIVERY AFFECTING PREGNANCY: ICD-10-CM

## 2024-11-25 DIAGNOSIS — Z3A.14 14 WEEKS GESTATION OF PREGNANCY: ICD-10-CM

## 2024-11-25 DIAGNOSIS — O30.011 MONOAMNIOTIC AND MONOCHORIONIC TWIN GESTATION IN FIRST TRIMESTER: ICD-10-CM

## 2024-11-25 DIAGNOSIS — D69.3 IDIOPATHIC THROMBOCYTOPENIC PURPURA (ITP): ICD-10-CM

## 2024-11-25 DIAGNOSIS — O30.032 MONOCHORIONIC DIAMNIOTIC TWIN GESTATION IN SECOND TRIMESTER: Primary | ICD-10-CM

## 2024-11-25 DIAGNOSIS — R35.0 URINARY FREQUENCY: ICD-10-CM

## 2024-11-25 LAB
BILIRUB SERPL-MCNC: NORMAL MG/DL
BLOOD URINE, POC: NORMAL
CLARITY, UA: CLEAR
COLOR, UA: NORMAL
GLUCOSE UR QL STRIP: NORMAL
KETONES UR QL STRIP: NORMAL
LEUKOCYTE ESTERASE URINE, POC: NORMAL
NITRITE, POC UA: NORMAL
PH, POC UA: 6
PROTEIN, POC: NORMAL
SPECIFIC GRAVITY, POC UA: 1.02
UROBILINOGEN, POC UA: NORMAL

## 2024-11-25 PROCEDURE — 99999 PR PBB SHADOW E&M-EST. PATIENT-LVL III: CPT | Mod: PBBFAC,,, | Performed by: ADVANCED PRACTICE MIDWIFE

## 2024-11-25 PROCEDURE — 0502F SUBSEQUENT PRENATAL CARE: CPT | Mod: S$GLB,,, | Performed by: ADVANCED PRACTICE MIDWIFE

## 2024-11-25 RX ORDER — IRON,CARB/VIT C/VIT B12/FOLIC 100-250-1
1 TABLET ORAL WEEKLY
Qty: 30 EACH | Refills: 11 | Status: SHIPPED | OUTPATIENT
Start: 2024-11-25 | End: 2024-11-25

## 2024-11-25 RX ORDER — IRON,CARB/VIT C/VIT B12/FOLIC 100-250-1
1 TABLET ORAL DAILY
Qty: 30 EACH | Refills: 11 | Status: SHIPPED | OUTPATIENT
Start: 2024-11-25

## 2024-11-25 NOTE — PROGRESS NOTES
Return OB Visit    29 y.o. female  at 14w4d   She denies any complaints. Dr. Tellez appt on  ,  and .  Saw Hematology, Dr Barbosa, with no further recommendations at this time.   Denies any vaginal bleeding, leakage of fluid, cramping, contractions, or pressure.   Total weight gain/weight loss in pregnancy: -0.454 kg (-1 lb)     Vitals  BP: 110/70  Pulse: 78  Weight: 66.7 kg (147 lb)  Prenatal  Fetal Heart Rate: A:152,B:147  Movement: Absent  Edema  LLE Edema: None  RLE Edema: None  Facial: None  Additional Edema?: No    Prenatal Labs:  Lab Results   Component Value Date    GROUPTRH O POS 2024    HGB 14.3 2024    HCT 43.9 2024    PLT 90 (L) 2024    HEPBSAG Non-Reactive 2024    [id number] Non-Reactive 2024    LABNGO Negative 2024    LABURIN Skin/Urogenital Meme Isolated, no further workup. 2024       A: 14w4d           ICD-10-CM ICD-9-CM    1. Monochorionic diamniotic twin gestation in second trimester  O30.032 651.03      V91.02       2. Previous  delivery affecting pregnancy  O34.219 654.20       3. 14 weeks gestation of pregnancy  Z3A.14 V22.2 POCT URINALYSIS W/O SCOPE      4. Idiopathic thrombocytopenic purpura (ITP)  D69.3 287.31       5. Urinary frequency  R35.0 788.41 POCT URINALYSIS W/O SCOPE          The following were addressed during this visit:    13-16 Weeks  - Anatomy Ultrasound   - Breastfeeding Concerns & Resources   - Importance of Early Skin to Skin Contact     -Benefits of breastfeeding   -Rooming In and Skin to Skin    P: Bleeding and  labor/labor precautions discussed.    Questions answered to desired level of satisfaction  Verbalized understanding to all information and instructions provided.  Follow up in about 7 weeks (around 2025) for OBV.    Nunu Chandra CNM, DA-BC

## 2025-01-13 ENCOUNTER — ROUTINE PRENATAL (OUTPATIENT)
Dept: OBSTETRICS AND GYNECOLOGY | Facility: CLINIC | Age: 30
End: 2025-01-13
Payer: COMMERCIAL

## 2025-01-13 VITALS
BODY MASS INDEX: 31.71 KG/M2 | HEART RATE: 78 BPM | DIASTOLIC BLOOD PRESSURE: 70 MMHG | WEIGHT: 157 LBS | SYSTOLIC BLOOD PRESSURE: 110 MMHG

## 2025-01-13 DIAGNOSIS — O34.219 PREVIOUS CESAREAN DELIVERY AFFECTING PREGNANCY: ICD-10-CM

## 2025-01-13 DIAGNOSIS — O30.032 MONOCHORIONIC DIAMNIOTIC TWIN GESTATION IN SECOND TRIMESTER: Primary | ICD-10-CM

## 2025-01-13 DIAGNOSIS — Z3A.21 21 WEEKS GESTATION OF PREGNANCY: ICD-10-CM

## 2025-01-13 DIAGNOSIS — D69.3 IDIOPATHIC THROMBOCYTOPENIC PURPURA (ITP): ICD-10-CM

## 2025-01-13 DIAGNOSIS — R35.0 URINARY FREQUENCY: ICD-10-CM

## 2025-01-13 LAB
BILIRUB SERPL-MCNC: NORMAL MG/DL
BLOOD URINE, POC: NORMAL
CLARITY, UA: NORMAL
COLOR, UA: NORMAL
GLUCOSE UR QL STRIP: NORMAL
KETONES UR QL STRIP: NORMAL
LEUKOCYTE ESTERASE URINE, POC: NORMAL
NITRITE, POC UA: NORMAL
PH, POC UA: 5
PROTEIN, POC: NORMAL
SPECIFIC GRAVITY, POC UA: 1.02
UROBILINOGEN, POC UA: NORMAL

## 2025-01-13 PROCEDURE — 99999 PR PBB SHADOW E&M-EST. PATIENT-LVL III: CPT | Mod: PBBFAC,,, | Performed by: ADVANCED PRACTICE MIDWIFE

## 2025-01-13 PROCEDURE — 0502F SUBSEQUENT PRENATAL CARE: CPT | Mod: S$GLB,,, | Performed by: ADVANCED PRACTICE MIDWIFE

## 2025-01-13 NOTE — PROGRESS NOTES
Return OB Visit    29 y.o. female  at 21w4d   She denies any complaints. MFM appointment next Wednesday.   Reports good fetal movement or fluttering. Denies any vaginal bleeding, leakage of fluid, cramping, contractions, or pressure.   Total weight gain/weight loss in pregnancy: 4.082 kg (9 lb)     Vitals  BP: 110/70  Pulse: 78  Weight: 71.2 kg (157 lb)  Prenatal  Fetal Heart Rate: A:150s B: 140s  Movement: Present  Edema  LLE Edema: None  RLE Edema: None  Facial: None  Additional Edema?: No    Prenatal Labs:  Lab Results   Component Value Date    GROUPTRH O POS 2024    HGB 14.3 2024    HCT 43.9 2024    PLT 90 (L) 2024    HEPBSAG Non-Reactive 2024    [id number] Non-Reactive 2024    LABNGO Negative 2024    LABURIN Skin/Urogenital Meme Isolated, no further workup. 2024       A: 21w4d           ICD-10-CM ICD-9-CM    1. Monochorionic diamniotic twin gestation in second trimester  O30.032 651.03      V91.02       2. Previous  delivery affecting pregnancy  O34.219 654.20       3. 21 weeks gestation of pregnancy  Z3A.21 V22.2 POCT URINALYSIS W/O SCOPE      4. Idiopathic thrombocytopenic purpura (ITP)  D69.3 287.31       5. Urinary frequency  R35.0 788.41 POCT URINALYSIS W/O SCOPE          The following were addressed during this visit:    17-20 Weeks  - Quickening   - Lifestyle   - Ultrasound   - Importance of Early and Frequent Breastfeeding   - Baby-led Feeding   - Frequent feeding to help assure optimal milk production     21-24 Weeks  -  Labor Signs   - Travel During Pregnancy   - Gestational diabetes screening protocol   - Effective Position and Latch   - Risks of Formula Use   - Risks of pacifier use     -Benefits of breastfeeding   -Rooming In and Skin to Skin    P: Bleeding, daily fetal kick counts, and  labor/labor precautions discussed.    Questions answered to desired level of satisfaction  Verbalized understanding to all information  and instructions provided.  Follow up in about 4 weeks (around 2/10/2025) for OBV.    Nunu Chandra CNM, WHMARIE-BC

## 2025-02-08 ENCOUNTER — HOSPITAL ENCOUNTER (EMERGENCY)
Facility: HOSPITAL | Age: 30
Discharge: HOME | End: 2025-02-08
Attending: OBSTETRICS & GYNECOLOGY
Payer: COMMERCIAL

## 2025-02-08 VITALS
RESPIRATION RATE: 20 BRPM | TEMPERATURE: 98 F | SYSTOLIC BLOOD PRESSURE: 116 MMHG | DIASTOLIC BLOOD PRESSURE: 70 MMHG | HEART RATE: 99 BPM | BODY MASS INDEX: 33.72 KG/M2 | WEIGHT: 160.63 LBS | HEIGHT: 58 IN

## 2025-02-08 DIAGNOSIS — D69.3 CHRONIC ITP (IDIOPATHIC THROMBOCYTOPENIA): ICD-10-CM
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
## 2025-02-08 DIAGNOSIS — O30.013 MONOAMNIOTIC AND MONOCHORIONIC TWIN GESTATION IN THIRD TRIMESTER: ICD-10-CM

## 2025-02-08 DIAGNOSIS — Z98.891 PREVIOUS CESAREAN SECTION: ICD-10-CM

## 2025-02-08 DIAGNOSIS — Z3A.25 25 WEEKS GESTATION OF PREGNANCY: Primary | ICD-10-CM

## 2025-02-08 LAB
BASOPHILS # BLD AUTO: 0.04 K/UL (ref 0–0.2)
BASOPHILS NFR BLD AUTO: 0.4 % (ref 0–1)
DIFFERENTIAL METHOD BLD: ABNORMAL
EOSINOPHIL # BLD AUTO: 0.08 K/UL (ref 0–0.5)
EOSINOPHIL NFR BLD AUTO: 0.9 % (ref 1–4)
ERYTHROCYTE [DISTWIDTH] IN BLOOD BY AUTOMATED COUNT: 13.7 % (ref 11.5–14.5)
HCT VFR BLD AUTO: 33.7 % (ref 38–47)
HGB BLD-MCNC: 10.9 G/DL (ref 12–16)
IMM GRANULOCYTES # BLD AUTO: 0.1 K/UL (ref 0–0.04)
IMM GRANULOCYTES NFR BLD: 1.1 % (ref 0–0.4)
LYMPHOCYTES # BLD AUTO: 2.06 K/UL (ref 1–4.8)
LYMPHOCYTES NFR BLD AUTO: 21.9 % (ref 27–41)
MCH RBC QN AUTO: 28.2 PG (ref 27–31)
MCHC RBC AUTO-ENTMCNC: 32.3 G/DL (ref 32–36)
MCV RBC AUTO: 87.1 FL (ref 80–96)
MONOCYTES # BLD AUTO: 0.75 K/UL (ref 0–0.8)
MONOCYTES NFR BLD AUTO: 8 % (ref 2–6)
MPC BLD CALC-MCNC: 11.5 FL (ref 9.4–12.4)
NEUTROPHILS # BLD AUTO: 6.36 K/UL (ref 1.8–7.7)
NEUTROPHILS NFR BLD AUTO: 67.7 % (ref 53–65)
NRBC # BLD AUTO: 0 X10E3/UL
NRBC, AUTO (.00): 0 %
PLATELET # BLD AUTO: 52 K/UL (ref 150–400)
RBC # BLD AUTO: 3.87 M/UL (ref 4.2–5.4)
WBC # BLD AUTO: 9.39 K/UL (ref 4.5–11)

## 2025-02-08 PROCEDURE — 36415 COLL VENOUS BLD VENIPUNCTURE: CPT | Performed by: OBSTETRICS & GYNECOLOGY

## 2025-02-08 PROCEDURE — 99284 EMERGENCY DEPT VISIT MOD MDM: CPT | Mod: 25

## 2025-02-08 PROCEDURE — 85025 COMPLETE CBC W/AUTO DIFF WBC: CPT | Performed by: OBSTETRICS & GYNECOLOGY

## 2025-02-08 NOTE — ED NOTES
Rec'd  at 25w2d gest ambulatory to OB ED c/o recent fall. Pt states she fell at approx 12 PM today. Pt denies any vaginal bleeding or LOF and endorses good fetal movement. NST button given to pt and pt instructed on how to perform NST. Pt verbalizes understanding and NST initiated.

## 2025-02-08 NOTE — ED NOTES
Dc instructions reviewed with pt and AVS given. Pt instructed to return to OB ED for any worsening symptoms, vaginal bleeding, leakage of fluid, or decreased fetal movement. Pt verbalizes understanding. Pt dc'd per order in stable condition, spouse at side.

## 2025-02-08 NOTE — ED PROVIDER NOTES
Encounter Date: 2025    [facility] Physician: Yuliya Abraham   Primary OBGYN:Nunu Chandra CNM    Admit Diagnosis/Chief Complaint: Trauma  History     Chief Complaint   Patient presents with    Fall     31 Y/O  @ 25+2 weeks GOVIND, CLIVE 2025, presents to OB ED after she fell while pumping gas about 1.5 hours ago. She has some abrasions on her knees and hands but nothing on her abdomen. No abdominal tenderness. She must have tried to prevent hitting her abdomen and scraped her knees and hands. She denies vaginal bleeding, LOF. Has good fetal movement both twin A and B confirmed with a bed side ultrasound since it was hard to monitor the twins because they are almost on top of each other. She is followed by Nunu Chandra. Had a C/S x 1 for FTP. Hx of ITP with platelet of 90 and is followed by [facility] in [place].        Obstetric HPI:  Patient reports None contractions, active fetal movement, absent vaginal bleeding , absent loss of fluid      Objective:     Vital Signs (Most Recent):  Temp: 97.8 °F (36.6 °C) (25 1248)  Pulse: 99 (25 1248)  Resp: 20 (25 1248)  BP: 116/70 (25 1248) Vital Signs (24h Range):  Temp:  [97.8 °F (36.6 °C)] 97.8 °F (36.6 °C)  Pulse:  [99] 99  Resp:  [20] 20  BP: (116)/(70) 116/70     Weight: 72.8 kg (160 lb 9.6 oz)  Body mass index is 33.57 kg/m².    FHT: A 127 BPM, B 149 BPM Cat 1 Twin A/B. Both reassuring for GA. Hard to monitor since the twins are next to each other, both vertex. U/S shows well being twin A and B  TOCO:  Neg.    No intake or output data in the 24 hours ending 25 1648    Cervical Exam:  Dilation:  Cervical length 3.7 cm.   Effacement:    Station:   Presentation:      Significant Labs:  Recent Lab Results         25  1341        Baso # 0.04       Basophil % 0.4       Differential Method Auto       Eos # 0.08       Eos % 0.9       Hematocrit 33.7       Hemoglobin 10.9       Immature Grans (Abs) 0.10       Immature Granulocytes 1.1        Lymph # 2.06       Lymph % 21.9       MCH 28.2       MCHC 32.3       MCV 87.1       Mono # 0.75       Mono % 8.0       MPV 11.5       Neutrophils, Abs 6.36       Neutrophils Relative 67.7       nRBC 0.0       NUCLEATED RBC ABSOLUTE 0.00       Platelet Count 52       RBC 3.87       RDW 13.7       WBC 9.39             Review of patient's allergies indicates:  No Known Allergies  Past Medical History:   Diagnosis Date    Acne     Idiopathic thrombocytopenic purpura (ITP)      Past Surgical History:   Procedure Laterality Date     SECTION N/A 10/4/2023    Procedure:  SECTION;  Surgeon: Jermaine Sosa DO;  Location: [facility] L&D;  Service: OB/GYN;  Laterality: N/A;     Family History   Problem Relation Name Age of Onset    Thrombocytopenia Mother      Thrombocytopenia Sister      Thrombocytopenia Maternal Grandmother      Prostate cancer Maternal Grandfather      Breast cancer Neg Hx      Colon cancer Neg Hx      Ovarian cancer Neg Hx       Social History     Tobacco Use    Smoking status: Never    Smokeless tobacco: Never   Substance Use Topics    Alcohol use: Never    Drug use: Never     Review of Systems   Constitutional: Negative.    Respiratory: Negative.     Cardiovascular: Negative.    Gastrointestinal: Negative.    Genitourinary: Negative.    Neurological: Negative.    Psychiatric/Behavioral: Negative.         Physical Exam     Initial Vitals [25 1248]   BP Pulse Resp Temp SpO2   116/70 99 20 97.8 °F (36.6 °C) --      MAP       --         Physical Exam    Constitutional: She appears well-developed and well-nourished. No distress.   HENT:   Head: Normocephalic and atraumatic.   Cardiovascular:  Normal rate and regular rhythm.           Pulmonary/Chest: No respiratory distress. She has no wheezes.   Abdominal: Abdomen is soft. She exhibits no distension. There is no abdominal tenderness.   Genitourinary:    Uterus normal.     Musculoskeletal:         General: No tenderness or edema.      Neurological: She is alert and oriented to person, place, and time.   Psychiatric: She has a normal mood and affect.         ED Course     Labs Reviewed   CBC WITH DIFFERENTIAL - Abnormal       Result Value    WBC 9.39      RBC 3.87 (*)     Hemoglobin 10.9 (*)     Hematocrit 33.7 (*)     MCV 87.1      MCH 28.2      MCHC 32.3      RDW 13.7      Platelet Count 52 (*)     MPV 11.5      Neutrophils % 67.7 (*)     Lymphocytes % 21.9 (*)     Monocytes % 8.0 (*)     Eosinophils % 0.9 (*)     Basophils % 0.4      Immature Granulocytes % 1.1 (*)     nRBC, Auto 0.0      Neutrophils, Abs 6.36      Lymphocytes, Absolute 2.06      Monocytes, Absolute 0.75      Eosinophils, Absolute 0.08      Basophils, Absolute 0.04      Immature Granulocytes, Absolute 0.10 (*)     nRBC, Absolute 0.00      Diff Type Auto     CBC W/ AUTO DIFFERENTIAL    Narrative:     The following orders were created for panel order CBC Auto Differential.  Procedure                               Abnormality         Status                     ---------                               -----------         ------                     CBC with Differential[[id number]]       Abnormal            Final result                 Please view results for these tests on the individual orders.        Imaging Results              US OB Limited 1 Or More Gestations (Final result)  Result time 02/08/25 14:35:25      Final result by Magno Dumont MD (02/08/25 14:35:25)                   Impression:      Live diamniotic twin gestation identified, each in cephalic presentation.      Electronically signed by: Magno Dumont MD  Date:    02/08/2025  Time:    14:35               Narrative:    EXAMINATION:  US OB LIMITED 1 OR MORE GESTATIONS    CLINICAL HISTORY:  FHR, positioning;    TECHNIQUE:  Limited transabdominal 2nd or 3rd trimester obstetrical ultrasound was performed.    COMPARISON:  Pelvic ultrasound 10/28/2024    FINDINGS:  Redemonstrated diamniotic twin gestation.  Thin  dividing membrane is seen.  Placenta is posterior.  Twin A in cephalic presentation with fetal heart rate of 137 beats per minute.  Twin B in cephalic presentation with fetal heart rate of 149 beats per minute.  Adequate movement seen at both fetuses.    Dedicated fetal survey and the individual biometric parameters were not assessed.    Amniotic fluid volume was not quantified.    The cervix appears closed and there is no evidence placenta previa.  The cervical length is 3.7 cm.                                       Medical Decision Making  31 Y/O  with twins at 25+2 weeks s/p fall but no direct trauma to abdomen  Observed x 4 hours. No vaginal bleeding, LOF  Fetal surveillance has been reassuring.  Discharge home with precautions  ITP with thrombocytopenia  Sees M this coming Thursday.No evidence of bleeding.    Amount and/or Complexity of Data Reviewed  Labs: ordered.  Radiology: ordered.       Medications - No data to display                           Clinical Impression:   Final diagnoses:  [Z3A.25] 25 weeks gestation of pregnancy (Primary)  [W19.XXXA] Fall, initial encounter  [O30.013] Monoamniotic and monochorionic twin gestation in third trimester  [D69.3] Chronic ITP (idiopathic thrombocytopenia)  [Z98.891] Previous  section        ED Disposition Condition    Discharge Stable          ED Prescriptions    None       Follow-up Information       Follow up With Specialties Details Why Contact Info    Nunu Chandra CNM Obstetrics and Gynecology Call  As needed [address] [facility] [address]  [phone]               Yuliya Abraham MD  25 [phone]

## 2025-02-08 NOTE — DISCHARGE INSTRUCTIONS
Be sure to keep all OB appointments. Return to OB ED for any worsening symptoms, vaginal bleeding, leakage of fluid, or decreased fetal movement.

## 2025-02-10 ENCOUNTER — ROUTINE PRENATAL (OUTPATIENT)
Dept: OBSTETRICS AND GYNECOLOGY | Facility: CLINIC | Age: 30
End: 2025-02-10
Payer: COMMERCIAL

## 2025-02-10 VITALS — WEIGHT: 166 LBS | SYSTOLIC BLOOD PRESSURE: 112 MMHG | DIASTOLIC BLOOD PRESSURE: 58 MMHG | BODY MASS INDEX: 34.69 KG/M2

## 2025-02-10 DIAGNOSIS — O34.219 PREVIOUS CESAREAN DELIVERY AFFECTING PREGNANCY: ICD-10-CM

## 2025-02-10 DIAGNOSIS — D69.3 IDIOPATHIC THROMBOCYTOPENIC PURPURA (ITP): ICD-10-CM

## 2025-02-10 DIAGNOSIS — Z3A.25 25 WEEKS GESTATION OF PREGNANCY: ICD-10-CM

## 2025-02-10 DIAGNOSIS — R35.0 URINARY FREQUENCY: ICD-10-CM

## 2025-02-10 DIAGNOSIS — O30.032 MONOCHORIONIC DIAMNIOTIC TWIN GESTATION IN SECOND TRIMESTER: Primary | ICD-10-CM

## 2025-02-10 LAB
BILIRUB SERPL-MCNC: NORMAL MG/DL
BLOOD URINE, POC: NORMAL
CLARITY, UA: NORMAL
COLOR, UA: NORMAL
GLUCOSE UR QL STRIP: NORMAL
KETONES UR QL STRIP: NORMAL
LEUKOCYTE ESTERASE URINE, POC: NORMAL
NITRITE, POC UA: NORMAL
PH, POC UA: 6.5
PROTEIN, POC: NORMAL
SPECIFIC GRAVITY, POC UA: 1.02
UROBILINOGEN, POC UA: NORMAL

## 2025-02-10 PROCEDURE — 0502F SUBSEQUENT PRENATAL CARE: CPT | Mod: S$GLB,,, | Performed by: ADVANCED PRACTICE MIDWIFE

## 2025-02-10 PROCEDURE — 99999 PR PBB SHADOW E&M-EST. PATIENT-LVL III: CPT | Mod: PBBFAC,,, | Performed by: ADVANCED PRACTICE MIDWIFE

## 2025-02-10 NOTE — PROGRESS NOTES
Return OB Visit    30 y.o. female  at 25w4d   She denies any complaints. Fall on  and went to L&D.  Monitored for 4-5 hours and discharged. PLT 52.  Has an appt with Dr. Tellez on .   Reports good fetal movement or fluttering. Denies any vaginal bleeding, leakage of fluid, cramping, contractions, or pressure.   Total weight gain/weight loss in pregnancy: 8.165 kg (18 lb)     Vitals  BP: (!) 112/58  Weight: 75.3 kg (166 lb)  Prenatal  Fetal Heart Rate: A:140's; B: 130s  Movement: Present  Edema  LLE Edema: None  RLE Edema: None  Facial: None  Additional Edema?: No    Prenatal Labs:  Lab Results   Component Value Date    GROUPTRH O POS 2024    HGB 10.9 (L) 2025    HCT 33.7 (L) 2025    PLT 52 (L) 2025    HEPBSAG Non-Reactive 2024    [id number] Non-Reactive 2024    LABNGO Negative 2024    LABURIN Skin/Urogenital Meme Isolated, no further workup. 2024       A: 25w4d           ICD-10-CM ICD-9-CM    1. Monochorionic diamniotic twin gestation in second trimester  O30.032 651.03 Glucose, 1Hr Post Prandial     V91.02 Treponema Pallidum (Syphillis) Antibody      CBC Auto Differential      2. Previous  delivery affecting pregnancy  O34.219 654.20 Glucose, 1Hr Post Prandial      Treponema Pallidum (Syphillis) Antibody      CBC Auto Differential      3. 25 weeks gestation of pregnancy  Z3A.25 V22.2 POCT URINALYSIS W/O SCOPE      4. Idiopathic thrombocytopenic purpura (ITP)  D69.3 287.31 Glucose, 1Hr Post Prandial      Treponema Pallidum (Syphillis) Antibody      CBC Auto Differential      5. Urinary frequency  R35.0 788.41 POCT URINALYSIS W/O SCOPE          The following were addressed during this visit:    25-28 Weeks  -  Labor Signs   - Childbirth Education   - Maternity Leave paperwork   - Smoking Intervention   - Weight Gain/Diet/Exercise   - Rooming in baby during your hospital stay     -Benefits of breastfeeding   -Rooming In and Skin to Skin    P:  Bleeding, daily fetal kick counts, and  labor/labor precautions discussed.    Questions answered to desired level of satisfaction  Verbalized understanding to all information and instructions provided.  Follow up in about 3 weeks (around 3/3/2025) for OBV, 1hr GTT.    Nunu Chandra CNM, DA-BC

## 2025-03-03 ENCOUNTER — ROUTINE PRENATAL (OUTPATIENT)
Dept: OBSTETRICS AND GYNECOLOGY | Facility: CLINIC | Age: 30
End: 2025-03-03
Payer: COMMERCIAL

## 2025-03-03 VITALS
DIASTOLIC BLOOD PRESSURE: 78 MMHG | WEIGHT: 167 LBS | HEART RATE: 78 BPM | SYSTOLIC BLOOD PRESSURE: 118 MMHG | BODY MASS INDEX: 34.9 KG/M2

## 2025-03-03 DIAGNOSIS — R35.0 URINARY FREQUENCY: ICD-10-CM

## 2025-03-03 DIAGNOSIS — Z3A.28 28 WEEKS GESTATION OF PREGNANCY: ICD-10-CM

## 2025-03-03 DIAGNOSIS — O34.219 PREVIOUS CESAREAN DELIVERY AFFECTING PREGNANCY: ICD-10-CM

## 2025-03-03 DIAGNOSIS — O30.033 MONOCHORIONIC DIAMNIOTIC TWIN GESTATION IN THIRD TRIMESTER: Primary | ICD-10-CM

## 2025-03-03 DIAGNOSIS — D69.3 IDIOPATHIC THROMBOCYTOPENIC PURPURA (ITP): ICD-10-CM

## 2025-03-03 LAB
BILIRUB SERPL-MCNC: NORMAL MG/DL
BLOOD URINE, POC: NORMAL
CLARITY, UA: CLEAR
COLOR, UA: YELLOW
GLUCOSE UR QL STRIP: NORMAL
KETONES UR QL STRIP: NORMAL
LEUKOCYTE ESTERASE URINE, POC: NORMAL
NITRITE, POC UA: NORMAL
PH, POC UA: 6.5
PROTEIN, POC: NORMAL
SPECIFIC GRAVITY, POC UA: 1.02
UROBILINOGEN, POC UA: NORMAL

## 2025-03-03 PROCEDURE — 99999 PR PBB SHADOW E&M-EST. PATIENT-LVL III: CPT | Mod: PBBFAC,,, | Performed by: ADVANCED PRACTICE MIDWIFE

## 2025-03-03 PROCEDURE — 0502F SUBSEQUENT PRENATAL CARE: CPT | Mod: S$GLB,,, | Performed by: ADVANCED PRACTICE MIDWIFE

## 2025-03-03 NOTE — PROGRESS NOTES
Return OB Visit    30 y.o. female  at 28w4d   She denies any complaints. Seeing  Wednesday.   Reports good fetal movement or fluttering. Denies any vaginal bleeding, leakage of fluid, cramping, contractions, or pressure.   Total weight gain/weight loss in pregnancy: 8.618 kg (19 lb)     Vitals  BP: 118/78  Pulse: 78  Weight: 75.8 kg (167 lb)  Prenatal  Fetal Heart Rate: A:130s; B: 140s  Movement: Present  Edema  LLE Edema: None  RLE Edema: None  Facial: None  Additional Edema?: No    Prenatal Labs:  Lab Results   Component Value Date    GROUPTRH O POS 2024    HGB 10.5 (L) 2025    HCT 35.1 (L) 2025    PLT 50 (L) 2025    HEPBSAG Non-Reactive 2024    [id number] Non-Reactive 2024    LABNGO Negative 2024    LABURIN Skin/Urogenital Meme Isolated, no further workup. 2024       A: 28w4d           ICD-10-CM ICD-9-CM    1. Monochorionic diamniotic twin gestation in third trimester  O30.033 651.03      V91.02       2. Previous  delivery affecting pregnancy  O34.219 654.20       3. 28 weeks gestation of pregnancy  Z3A.28 V22.2 POCT URINALYSIS W/O SCOPE      4. Idiopathic thrombocytopenic purpura (ITP)  D69.3 287.31       5. Urinary frequency  R35.0 788.41 POCT URINALYSIS W/O SCOPE          No pregnancy checklist tasks were completed during this visit, and no tasks are pending completion.    -Benefits of breastfeeding   -Rooming In and Skin to Skin    P: Bleeding, daily fetal kick counts, and  labor/labor precautions discussed.    Questions answered to desired level of satisfaction  Verbalized understanding to all information and instructions provided.  Follow up in about 2 weeks (around 3/17/2025) for OBV.    Nunu Chandra CNM, DA-BC

## 2025-03-05 DIAGNOSIS — O30.033 MONOCHORIONIC DIAMNIOTIC TWIN GESTATION IN THIRD TRIMESTER: Primary | ICD-10-CM

## 2025-03-05 DIAGNOSIS — D69.3 IDIOPATHIC THROMBOCYTOPENIC PURPURA (ITP): ICD-10-CM

## 2025-03-06 ENCOUNTER — TELEPHONE (OUTPATIENT)
Dept: OBSTETRICS AND GYNECOLOGY | Facility: CLINIC | Age: 30
End: 2025-03-06
Payer: COMMERCIAL

## 2025-03-06 NOTE — TELEPHONE ENCOUNTER
----- Message from Nunu sent at 3/5/2025  1:59 PM CST -----  Regarding: sooner appointment  Who Called: Dominiquepiper Hoyt is requesting a sooner appointment. Caller declined first available appointment listed below. Caller will not accept being placed on the waitlist and is requesting a message be sent to doctor.When is the first available appointment?n/a Options offered (Virtual Visit, Urgent Care): n/aSymptoms:needs an appointment on the week on the 12th due to her appointment in [place] on the 19th Preferred Method of Contact: Phone CallPatient's Preferred Phone Number on File: [phone] Best Call Back Number, if different:Additional Information:

## 2025-03-06 NOTE — TELEPHONE ENCOUNTER
Internal Medicine Subjective





- Subjective


Service Date: 06/12/18


Patient seen and examined:: without staff (HE FEELS WELL,NO PAIN.)


Patient is:: awake, verbal, in bed, talking


Per staff patient has:: no adverse event





Internal Medicine Objective





- Results


Result Diagrams: 


 06/12/18 04:20





 06/12/18 04:20


Recent Labs: 


 Laboratory Last Values











WBC  5.7 Th/cmm (4.8-10.8)   06/12/18  04:20    


 


RBC  3.96 Mil/cmm (4.30-5.70)  L  06/12/18  04:20    


 


Hgb  11.8 gm/dL (12-16)  L  06/12/18  04:20    


 


Hct  35.1 % (41.0-60)  L  06/12/18  04:20    


 


MCV  88.7 fl (80-99)   06/12/18  04:20    


 


MCH  29.8 pg (26.0-30.0)   06/12/18  04:20    


 


MCHC Differential  33.6 pg (28.0-36.0)   06/12/18  04:20    


 


RDW  15.6 % (11.5-20.0)   06/12/18  04:20    


 


Plt Count  100 Th/cmm (150-400)  L  06/12/18  04:20    


 


MPV  9.1 fl  06/12/18  04:20    


 


Neutrophils %  76.1 % (40.0-80.0)   06/10/18  05:52    


 


Band Neutrophils %  3 % (0-10)   06/12/18  04:20    


 


Lymphocytes %  10.7 % (20.0-50.0)  L  06/10/18  05:52    


 


Monocytes %  12.3 % (2.0-10.0)  H  06/10/18  05:52    


 


Eosinophils %  0.4 % (0.0-5.0)   06/10/18  05:52    


 


Basophils %  0.5 % (0.0-2.0)   06/10/18  05:52    


 


Neutrophils (Manual)  50 % (40-80)   06/12/18  04:20    


 


Lymphocytes  28 % (20-50)   06/12/18  04:20    


 


Monocytes  19 % (2-10)  H  06/12/18  04:20    


 


Eosinophils  0 % (0-5)   06/12/18  04:20    


 


Basophils  0 % (0-3)   06/12/18  04:20    


 


PT  11.0 SECONDS (9.5-11.5)   06/11/18  05:00    


 


INR  1.06  (0.5-1.4)   06/11/18  05:00    


 


PTT (Actin FS)  29.6 SECONDS (26.0-38.0)   06/11/18  05:00    


 


Sodium  134 mEq/L (136-145)  L  06/12/18  04:20    


 


Potassium  3.6 mEq/L (3.5-5.1)   06/12/18  04:20    


 


Chloride  105 mEq/L ()   06/12/18  04:20    


 


Carbon Dioxide  23.4 mEq/L (21.0-31.0)   06/12/18  04:20    


 


Anion Gap  9.2  (7.0-16.0)   06/12/18  04:20    


 


BUN  12 mg/dL (7-25)   06/12/18  04:20    


 


Creatinine  0.5 mg/dL (0.7-1.3)  L  06/12/18  04:20    


 


Est GFR ( Amer)  > 60.0 ml/min (>90)   06/12/18  04:20    


 


Est GFR (Non-Af Amer)  > 60.0 ml/min  06/12/18  04:20    


 


BUN/Creatinine Ratio  24.0   06/12/18  04:20    


 


Glucose  102 mg/dL ()   06/12/18  04:20    


 


Whole Bld Lactic Acid  2.29 mmol/L (0.60-1.99)  H*  06/09/18  19:35    


 


Calcium  8.2 mg/dL (8.6-10.3)  L  06/12/18  04:20    


 


Total Bilirubin  0.5 mg/dL (0.3-1.0)   06/12/18  04:20    


 


AST  33 U/L (13-39)   06/12/18  04:20    


 


ALT  20 U/L (7-52)   06/12/18  04:20    


 


Alkaline Phosphatase  78 U/L ()   06/12/18  04:20    


 


Troponin I  0.01 ng/mL (0.01-0.05)   06/09/18  16:57    


 


Total Protein  6.0 gm/dL (6.0-8.3)   06/12/18  04:20    


 


Albumin  2.6 gm/dL (4.2-5.5)  L  06/12/18  04:20    


 


Globulin  3.4 gm/dL  06/12/18  04:20    


 


Albumin/Globulin Ratio  0.8  (1.0-1.8)  L  06/12/18  04:20    


 


Urine Source  CLEAN C   06/11/18  03:50    


 


Urine Color  YELLOW   06/11/18  03:50    


 


Urine Clarity  CLEAR  (CLEAR)   06/11/18  03:50    


 


Urine pH  6.0  (4.6 - 8.0)   06/11/18  03:50    


 


Ur Specific Gravity  1.020  (1.005-1.030)   06/11/18  03:50    


 


Urine Protein  NEGATIVE mg/dL (NEGATIVE)   06/11/18  03:50    


 


Urine Glucose (UA)  NEGATIVE mg/dL (NEGATIVE)   06/11/18  03:50    


 


Urine Ketones  NEGATIVE mg/dL (NEGATIVE)   06/11/18  03:50    


 


Urine Blood  LARGE  (NEGATIVE)  H  06/11/18  03:50    


 


Urine Nitrate  NEGATIVE  (NEGATIVE)   06/11/18  03:50    


 


Urine Bilirubin  NEGATIVE  (NEGATIVE)   06/11/18  03:50    


 


Urine Urobilinogen  0.2 E.U./dL (0.2 - 1.0)   06/11/18  03:50    


 


Ur Leukocyte Esterase  NEGATIVE  (NEGATIVE)   06/11/18  03:50    


 


Urine RBC  0-2 /hpf (0-5)  H  06/11/18  03:50    


 


Urine WBC  0-2 /hpf (0-5)   06/11/18  03:50    


 


Ur Epithelial Cells  RARE /lpf (FEW)   06/11/18  03:50    


 


Urine Bacteria  FEW /hpf (NONE SEEN)   06/11/18  03:50    


 


Vancomycin Trough  16.6 ug/mL (5-10)  H  06/12/18  08:15    














- Physical Exam


Vitals and I&O: 


 Vital Signs











Temp  97.8 F   06/12/18 12:00


 


Pulse  59   06/12/18 12:00


 


Resp  19   06/12/18 12:00


 


BP  96/65   06/12/18 12:00


 


Pulse Ox  98   06/12/18 12:00








 Intake & Output











 06/11/18 06/12/18 06/12/18





 18:59 06:59 18:59


 


Intake Total 2500 870 


 


Balance 2500 870 


 


Weight (lbs) 79.379 kg 83.036 kg 


 


Intake:   


 


  Intake, IV Amount 500 250 


 


    Vancomycin HCl 1 gm In 500 250 





    Sodium Chloride 0.9% 250   





    ml @ 165 mls/hr IV Q8H   





    [facility] Rx#:[id number]   


 


  Oral 2000 620 


 


Other:   


 


  # Voids 2 3 


 


  Stool Characteristics Liquid Liquid Liquid





 Brown  Brown


 


  Weight Source Bedscale Bedscale 











Active Medications: 


Current Medications





Acetaminophen (Tylenol)  650 mg PO PRN PRN


   PRN Reason: TEMPERATURE >100C


   Stop: 08/08/18 21:13


   Last Admin: 06/10/18 04:06 Dose:  650 mg


Acetaminophen/Hydrocodone Bitart (Norco 10 Mg/325 Mg)  1 tab PO Q6H PRN


   PRN Reason: Pain (Severe)


   Stop: 08/08/18 22:06


   Last Admin: 06/11/18 16:58 Dose:  1 tab


Enoxaparin Sodium (Lovenox)  30 mg SUBQ Q12HR [facility]


   Stop: 08/08/18 22:29


   Last Admin: 06/12/18 08:46 Dose:  Not Given


Vancomycin HCl 1 gm/ Sodium (Chloride)  250 mls @ 165 mls/hr IV Q8H [facility]


   Stop: 08/10/18 08:59


   Last Admin: 06/12/18 11:05 Dose:  165 mls/hr


Miscellaneous (Vancomycin Iv Per Pharmacy)  1 ea MC DAILY [facility]


   Stop: 08/09/18 08:59








General: alert


HEENT: NC/AT, PERRLA, EOMI, anicteric sclerae, throat clear


Neck: Supple, No JVD, No thyromegaly, No LAD


Lungs: CTAB


Cardiovascular: Normal S1, without murmur


Abdomen: non-tender, non-distended


Extremities: other (RIGHT HAND HAS WOUND AND LEFT LOWER EXTREMETY HAS OPENED 

WOUND.)





Internal Medicine Assmt/Plan





- Assessment


Assessment: 





1.RIGHT HAND SKIN LACERATION AND SP SURGICAL DEBRIDMENT.


2.RIGHT LOWER EXTR.OPENED WOUND AND SP SURGICAL DEBRIDMENT.





- Plan


Plan: 





CONTINUE ON CURRENT MEDICATION AND DIET AND WOUND CARE. Patient scheduled for ultrasound Monday at 3. Will have OB appt the following week.

## 2025-03-10 ENCOUNTER — HOSPITAL ENCOUNTER (OUTPATIENT)
Dept: OBSTETRICS AND GYNECOLOGY | Facility: CLINIC | Age: 30
Discharge: HOME | End: 2025-03-10
Payer: COMMERCIAL

## 2025-03-10 DIAGNOSIS — D69.3 IDIOPATHIC THROMBOCYTOPENIC PURPURA (ITP): ICD-10-CM

## 2025-03-10 DIAGNOSIS — O30.033 MONOCHORIONIC DIAMNIOTIC TWIN GESTATION IN THIRD TRIMESTER: ICD-10-CM

## 2025-03-10 PROCEDURE — 76821 MIDDLE CEREBRAL ARTERY ECHO: CPT | Mod: 26,59,, | Performed by: OBSTETRICS & GYNECOLOGY

## 2025-03-10 PROCEDURE — 76820 UMBILICAL ARTERY ECHO: CPT | Mod: 26,59,, | Performed by: OBSTETRICS & GYNECOLOGY

## 2025-03-10 PROCEDURE — 76819 FETAL BIOPHYS PROFIL W/O NST: CPT | Mod: 26,59,, | Performed by: OBSTETRICS & GYNECOLOGY

## 2025-03-17 ENCOUNTER — ROUTINE PRENATAL (OUTPATIENT)
Dept: OBSTETRICS AND GYNECOLOGY | Facility: CLINIC | Age: 30
End: 2025-03-17
Payer: COMMERCIAL

## 2025-03-17 VITALS
DIASTOLIC BLOOD PRESSURE: 70 MMHG | BODY MASS INDEX: 35.32 KG/M2 | WEIGHT: 169 LBS | HEART RATE: 89 BPM | SYSTOLIC BLOOD PRESSURE: 112 MMHG

## 2025-03-17 DIAGNOSIS — O34.219 PREVIOUS CESAREAN DELIVERY AFFECTING PREGNANCY: ICD-10-CM

## 2025-03-17 DIAGNOSIS — Z3A.30 30 WEEKS GESTATION OF PREGNANCY: ICD-10-CM

## 2025-03-17 DIAGNOSIS — R35.0 URINARY FREQUENCY: ICD-10-CM

## 2025-03-17 DIAGNOSIS — D69.3 IDIOPATHIC THROMBOCYTOPENIC PURPURA (ITP): ICD-10-CM

## 2025-03-17 DIAGNOSIS — O30.033 MONOCHORIONIC DIAMNIOTIC TWIN GESTATION IN THIRD TRIMESTER: Primary | ICD-10-CM

## 2025-03-17 LAB
BILIRUB SERPL-MCNC: NORMAL MG/DL
BLOOD URINE, POC: NORMAL
CLARITY, UA: CLEAR
COLOR, UA: YELLOW
GLUCOSE UR QL STRIP: NORMAL
KETONES UR QL STRIP: NORMAL
LEUKOCYTE ESTERASE URINE, POC: NORMAL
NITRITE, POC UA: NORMAL
PH, POC UA: 6
PROTEIN, POC: NORMAL
SPECIFIC GRAVITY, POC UA: 1.01
UROBILINOGEN, POC UA: NORMAL

## 2025-03-17 PROCEDURE — 0502F SUBSEQUENT PRENATAL CARE: CPT | Mod: S$GLB,,, | Performed by: ADVANCED PRACTICE MIDWIFE

## 2025-03-17 PROCEDURE — 99999 PR PBB SHADOW E&M-EST. PATIENT-LVL III: CPT | Mod: PBBFAC,,, | Performed by: ADVANCED PRACTICE MIDWIFE

## 2025-03-17 NOTE — PROGRESS NOTES
Return OB Visit    30 y.o. female  at 30w4d   She denies any complaints. Seeing  Wednesday.   Reports good fetal movement or fluttering. Denies any vaginal bleeding, leakage of fluid, cramping, contractions, or pressure.   Total weight gain/weight loss in pregnancy: 9.526 kg (21 lb)     Vitals  BP: 112/70  Pulse: 89  Weight: 76.7 kg (169 lb)  Prenatal  Fetal Heart Rate: A: 140s; B: 130s  Movement: Present  Edema  LLE Edema: Mild pitting, slight indentation  RLE Edema: Mild pitting, slight indentation  Facial: None  Additional Edema?: No    Prenatal Labs:  Lab Results   Component Value Date    GROUPTRH O POS 2024    HGB 10.5 (L) 2025    HCT 35.1 (L) 2025    PLT 50 (L) 2025    HEPBSAG Non-Reactive 2024    [id number] Non-Reactive 2024    LABNGO Negative 2024    LABURIN Skin/Urogenital Meme Isolated, no further workup. 2024     A: 30w4d           ICD-10-CM ICD-9-CM    1. Monochorionic diamniotic twin gestation in third trimester  O30.033 651.03 US OB/GYN Procedure (Viewpoint) - Extended List - Future     V91.02       2. Idiopathic thrombocytopenic purpura (ITP)  D69.3 287.31 US OB/GYN Procedure (Viewpoint) - Extended List - Future      3. Previous  delivery affecting pregnancy  O34.219 654.20       4. 30 weeks gestation of pregnancy  Z3A.30 V22.2 POCT URINALYSIS W/O SCOPE      5. Urinary frequency  R35.0 788.41 POCT URINALYSIS W/O SCOPE        The following were addressed during this visit:    29-32 Weeks  - Contraception/Tubal Consent   - Pre-registration   - Circumsision plans   - Op note review/ consent   - Birth Plan   - Pediatrician   - Fetal Kick Counts/PIH/PTL precautions   - Preeclampsia Education   - Quiet time     -Benefits of breastfeeding   -Rooming In and Skin to Skin    P: Bleeding, daily fetal kick counts, and  labor/labor precautions discussed.  Continue weekly BPP/Cord dopplers per Dr Tellez's request    Questions answered to  desired level of satisfaction  Verbalized understanding to all information and instructions provided.  Follow up in about 1 week (around 3/24/2025) for OBV, Ultrasound (BPP, Cord doppler, twins).    Nunu Chandra CNM, DA-BC

## 2025-03-24 ENCOUNTER — HOSPITAL ENCOUNTER (OUTPATIENT)
Dept: OBSTETRICS AND GYNECOLOGY | Facility: CLINIC | Age: 30
Discharge: HOME | End: 2025-03-24
Payer: COMMERCIAL

## 2025-03-24 ENCOUNTER — ROUTINE PRENATAL (OUTPATIENT)
Dept: OBSTETRICS AND GYNECOLOGY | Facility: CLINIC | Age: 30
End: 2025-03-24
Payer: COMMERCIAL

## 2025-03-24 VITALS
BODY MASS INDEX: 35.32 KG/M2 | SYSTOLIC BLOOD PRESSURE: 112 MMHG | WEIGHT: 169 LBS | HEART RATE: 100 BPM | DIASTOLIC BLOOD PRESSURE: 70 MMHG

## 2025-03-24 DIAGNOSIS — D69.3 IDIOPATHIC THROMBOCYTOPENIC PURPURA (ITP): ICD-10-CM

## 2025-03-24 DIAGNOSIS — R35.0 URINARY FREQUENCY: ICD-10-CM

## 2025-03-24 DIAGNOSIS — O30.033 MONOCHORIONIC DIAMNIOTIC TWIN GESTATION IN THIRD TRIMESTER: ICD-10-CM

## 2025-03-24 DIAGNOSIS — O34.219 PREVIOUS CESAREAN DELIVERY AFFECTING PREGNANCY: ICD-10-CM

## 2025-03-24 DIAGNOSIS — O30.033 MONOCHORIONIC DIAMNIOTIC TWIN GESTATION IN THIRD TRIMESTER: Primary | ICD-10-CM

## 2025-03-24 DIAGNOSIS — Z3A.31 31 WEEKS GESTATION OF PREGNANCY: ICD-10-CM

## 2025-03-24 LAB
BILIRUB SERPL-MCNC: NORMAL MG/DL
BLOOD URINE, POC: NORMAL
CLARITY, UA: CLEAR
COLOR, UA: YELLOW
GLUCOSE UR QL STRIP: NORMAL
KETONES UR QL STRIP: NORMAL
LEUKOCYTE ESTERASE URINE, POC: NORMAL
NITRITE, POC UA: NORMAL
PH, POC UA: 6
PROTEIN, POC: NORMAL
SPECIFIC GRAVITY, POC UA: 1.02
UROBILINOGEN, POC UA: NORMAL

## 2025-03-24 PROCEDURE — 76820 UMBILICAL ARTERY ECHO: CPT | Mod: 26,59,, | Performed by: OBSTETRICS & GYNECOLOGY

## 2025-03-24 PROCEDURE — 99999 PR PBB SHADOW E&M-EST. PATIENT-LVL III: CPT | Mod: PBBFAC,,, | Performed by: ADVANCED PRACTICE MIDWIFE

## 2025-03-24 PROCEDURE — 76819 FETAL BIOPHYS PROFIL W/O NST: CPT | Mod: 26,59,, | Performed by: OBSTETRICS & GYNECOLOGY

## 2025-03-24 PROCEDURE — 0502F SUBSEQUENT PRENATAL CARE: CPT | Mod: S$GLB,,, | Performed by: ADVANCED PRACTICE MIDWIFE

## 2025-03-24 NOTE — PROGRESS NOTES
Return OB Visit    30 y.o. female  at 31w4d   She c/o fatigue.  Appt with Dr Tellez next week.  Appt with Dr Barbosa in 2 weeks  Reports good fetal movement or fluttering. Denies any vaginal bleeding, leakage of fluid, cramping, contractions, or pressure.   Total weight gain/weight loss in pregnancy: 9.526 kg (21 lb)     Vitals  BP: 112/70  Pulse: 100  Weight: 76.7 kg (169 lb)  Prenatal  Fetal Heart Rate: A:132  ,B:137  Movement: Present  Edema  LLE Edema: None  RLE Edema: None  Facial: None  Additional Edema?: No    US today  Twin A BPP 8/8, S/D 2.72  Twin B BPP 8/8, S/D 2.51    Prenatal Labs:  Lab Results   Component Value Date    GROUPTRH O POS 2024    HGB 10.5 (L) 2025    HCT 35.1 (L) 2025    PLT 50 (L) 2025    HEPBSAG Non-Reactive 2024    [id number] Non-Reactive 2024    LABNGO Negative 2024    LABURIN Skin/Urogenital Meme Isolated, no further workup. 2024     A: 31w4d           ICD-10-CM ICD-9-CM    1. Monochorionic diamniotic twin gestation in third trimester  O30.033 651.03      V91.02       2. Idiopathic thrombocytopenic purpura (ITP)  D69.3 287.31       3. Previous  delivery affecting pregnancy  O34.219 654.20       4. 31 weeks gestation of pregnancy  Z3A.31 V22.2 POCT URINALYSIS W/O SCOPE      5. Urinary frequency  R35.0 788.41 POCT URINALYSIS W/O SCOPE          No pregnancy checklist tasks were completed during this visit, and no tasks are pending completion.    -Benefits of breastfeeding   -Rooming In and Skin to Skin    P: Bleeding, daily fetal kick counts, and  labor/labor precautions discussed.    Questions answered to desired level of satisfaction  Verbalized understanding to all information and instructions provided.  Follow up in about 2 weeks (around 2025) for OBV, Ultrasound (BPP/Cord Doppler, Twins, Mono/Di, Polyhdramnios).    Nunu Chandra CNM, WHNP-BC

## 2025-03-31 ENCOUNTER — TELEPHONE (OUTPATIENT)
Dept: OBSTETRICS AND GYNECOLOGY | Facility: CLINIC | Age: 30
End: 2025-03-31
Payer: COMMERCIAL

## 2025-03-31 NOTE — TELEPHONE ENCOUNTER
----- Message from Wiliam sent at 3/31/2025 10:37 AM CDT -----  Regarding: pt call back  Who Called: Dominique Morales is calling asking if someone from the office would give her a call she is at the clinic sick and wants to know if she can get a rocephrin shot while pregnantPreferred Method of Contact: Phone CallPatient's Preferred Phone Number on File: [phone] Best Call Back Number, if different:Additional Information:

## 2025-04-08 ENCOUNTER — HOSPITAL ENCOUNTER (OUTPATIENT)
Dept: OBSTETRICS AND GYNECOLOGY | Facility: CLINIC | Age: 30
Discharge: HOME | End: 2025-04-08
Payer: COMMERCIAL

## 2025-04-08 ENCOUNTER — ROUTINE PRENATAL (OUTPATIENT)
Dept: OBSTETRICS AND GYNECOLOGY | Facility: CLINIC | Age: 30
End: 2025-04-08
Payer: COMMERCIAL

## 2025-04-08 VITALS
HEART RATE: 98 BPM | DIASTOLIC BLOOD PRESSURE: 78 MMHG | SYSTOLIC BLOOD PRESSURE: 118 MMHG | BODY MASS INDEX: 35.74 KG/M2 | WEIGHT: 171 LBS

## 2025-04-08 DIAGNOSIS — Z3A.33 33 WEEKS GESTATION OF PREGNANCY: ICD-10-CM

## 2025-04-08 DIAGNOSIS — O34.219 PREVIOUS CESAREAN DELIVERY AFFECTING PREGNANCY: ICD-10-CM

## 2025-04-08 DIAGNOSIS — D69.3 IDIOPATHIC THROMBOCYTOPENIC PURPURA (ITP): ICD-10-CM

## 2025-04-08 DIAGNOSIS — Z3A.31 31 WEEKS GESTATION OF PREGNANCY: ICD-10-CM

## 2025-04-08 DIAGNOSIS — O30.033 MONOCHORIONIC DIAMNIOTIC TWIN GESTATION IN THIRD TRIMESTER: Primary | ICD-10-CM

## 2025-04-08 DIAGNOSIS — R35.0 URINARY FREQUENCY: ICD-10-CM

## 2025-04-08 DIAGNOSIS — O30.033 MONOCHORIONIC DIAMNIOTIC TWIN GESTATION IN THIRD TRIMESTER: ICD-10-CM

## 2025-04-08 PROCEDURE — 99999 PR PBB SHADOW E&M-EST. PATIENT-LVL III: CPT | Mod: PBBFAC,,, | Performed by: ADVANCED PRACTICE MIDWIFE

## 2025-04-08 PROCEDURE — 76816 OB US FOLLOW-UP PER FETUS: CPT | Mod: 26,59,, | Performed by: OBSTETRICS & GYNECOLOGY

## 2025-04-08 PROCEDURE — 0502F SUBSEQUENT PRENATAL CARE: CPT | Mod: S$GLB,,, | Performed by: ADVANCED PRACTICE MIDWIFE

## 2025-04-08 PROCEDURE — 76820 UMBILICAL ARTERY ECHO: CPT | Mod: 26,59,, | Performed by: OBSTETRICS & GYNECOLOGY

## 2025-04-08 PROCEDURE — 76819 FETAL BIOPHYS PROFIL W/O NST: CPT | Mod: 26,59,, | Performed by: OBSTETRICS & GYNECOLOGY

## 2025-04-08 NOTE — PROGRESS NOTES
Return OB Visit    30 y.o. female  at 33w5d   She denies any complaints. Seeing Dr. Tellez next Wednesday.   Reports good fetal movement or fluttering. Denies any vaginal bleeding, leakage of fluid, cramping, contractions, or pressure.   Total weight gain/weight loss in pregnancy: 10.4 kg (23 lb)     Vitals  BP: 118/78  Pulse: 98  Weight: 77.6 kg (171 lb)  Prenatal  Fetal Heart Rate: A:168,B:135  Movement: Present  Edema  LLE Edema: None  RLE Edema: None  Facial: None  Additional Edema?: No    US today   BPP Twin A and Twin B    Twin A MVP 11.0cm, S/D 1.70  Twin B MVP 5.8cm, S/D 2.55    Prenatal Labs:  Lab Results   Component Value Date    GROUPTRH O POS 2024    HGB 10.5 (L) 2025    HCT 35.1 (L) 2025    PLT 50 (L) 2025    HEPBSAG Non-Reactive 2024    [id number] Non-Reactive 2024    LABNGO Negative 2024    LABURIN Skin/Urogenital Meme Isolated, no further workup. 2024       A: 33w5d           ICD-10-CM ICD-9-CM    1. Monochorionic diamniotic twin gestation in third trimester  O30.033 651.03 US OB/GYN Procedure (Viewpoint) - Extended List - Future     V91.02       2. Idiopathic thrombocytopenic purpura (ITP)  D69.3 287.31 US OB/GYN Procedure (Viewpoint) - Extended List - Future      3. Previous  delivery affecting pregnancy  O34.219 654.20       4. 33 weeks gestation of pregnancy  Z3A.33 V22.2 POCT URINALYSIS W/O SCOPE      5. Urinary frequency  R35.0 788.41 POCT URINALYSIS W/O SCOPE          No pregnancy checklist tasks were completed during this visit, and no tasks are pending completion.    -Benefits of breastfeeding   -Rooming In and Skin to Skin    P: Bleeding, daily fetal kick counts, and  labor/labor precautions discussed.  Reviewed US results with Dr Tellez today.  No new recommendations.  Has follow up with her next week.    Questions answered to desired level of satisfaction  Verbalized understanding to all information and instructions  provided.  Follow up in about 2 weeks (around 4/22/2025) for OBV, Ultrasound (BPP, cord doppler).    Nunu Chandra CNM, WHNP-BC

## 2025-04-10 ENCOUNTER — TELEPHONE (OUTPATIENT)
Dept: OBSTETRICS AND GYNECOLOGY | Facility: CLINIC | Age: 30
End: 2025-04-10
Payer: COMMERCIAL

## 2025-04-10 NOTE — TELEPHONE ENCOUNTER
----- Message from Nunu sent at 4/10/2025 11:35 AM CDT -----  Regarding: following up  Who Called: Carolyne Caller is requesting assistance/information from provider's office.Symptoms (please be specific): following up on if paperwork for breast pump was received and if it was to please get it filled out and faxed back  Preferred Method of Contact: Phone CallPatient's Preferred Phone Number on File: [phone] Best Call Back Number, if different:Additional Information:

## 2025-04-17 ENCOUNTER — PATIENT MESSAGE (OUTPATIENT)
Dept: OBSTETRICS AND GYNECOLOGY | Facility: CLINIC | Age: 30
End: 2025-04-17
Payer: COMMERCIAL

## 2025-04-17 DIAGNOSIS — O30.033 MONOCHORIONIC DIAMNIOTIC TWIN GESTATION IN THIRD TRIMESTER: Primary | ICD-10-CM

## 2025-04-17 DIAGNOSIS — O34.219 PREVIOUS CESAREAN DELIVERY AFFECTING PREGNANCY: ICD-10-CM

## 2025-04-17 DIAGNOSIS — D69.3 IDIOPATHIC THROMBOCYTOPENIC PURPURA (ITP): ICD-10-CM

## 2025-04-21 ENCOUNTER — RESULTS FOLLOW-UP (OUTPATIENT)
Dept: OBSTETRICS AND GYNECOLOGY | Facility: CLINIC | Age: 30
End: 2025-04-21

## 2025-04-21 ENCOUNTER — TELEPHONE (OUTPATIENT)
Dept: OBSTETRICS AND GYNECOLOGY | Facility: CLINIC | Age: 30
End: 2025-04-21
Payer: COMMERCIAL

## 2025-04-21 ENCOUNTER — CLINICAL SUPPORT (OUTPATIENT)
Dept: OBSTETRICS AND GYNECOLOGY | Facility: CLINIC | Age: 30
End: 2025-04-21
Payer: COMMERCIAL

## 2025-04-21 DIAGNOSIS — O30.033 MONOCHORIONIC DIAMNIOTIC TWIN GESTATION IN THIRD TRIMESTER: Primary | ICD-10-CM

## 2025-04-21 DIAGNOSIS — D69.3 IDIOPATHIC THROMBOCYTOPENIC PURPURA (ITP): ICD-10-CM

## 2025-04-21 DIAGNOSIS — D69.3 IDIOPATHIC THROMBOCYTOPENIC PURPURA (ITP): Primary | ICD-10-CM

## 2025-04-21 PROCEDURE — 96372 THER/PROPH/DIAG INJ SC/IM: CPT | Mod: S$GLB,,, | Performed by: ADVANCED PRACTICE MIDWIFE

## 2025-04-21 PROCEDURE — 99999 PR PBB SHADOW E&M-EST. PATIENT-LVL II: CPT | Mod: PBBFAC,,,

## 2025-04-21 RX ORDER — BETAMETHASONE SODIUM PHOSPHATE AND BETAMETHASONE ACETATE 3; 3 MG/ML; MG/ML
12 INJECTION, SUSPENSION INTRA-ARTICULAR; INTRALESIONAL; INTRAMUSCULAR; SOFT TISSUE
Status: COMPLETED | OUTPATIENT
Start: 2025-04-21 | End: 2025-04-21

## 2025-04-21 RX ADMIN — BETAMETHASONE SODIUM PHOSPHATE AND BETAMETHASONE ACETATE 12 MG: 3; 3 INJECTION, SUSPENSION INTRA-ARTICULAR; INTRALESIONAL; INTRAMUSCULAR; SOFT TISSUE at 01:04

## 2025-04-22 ENCOUNTER — HOSPITAL ENCOUNTER (INPATIENT)
Facility: HOSPITAL | Age: 30
LOS: 3 days | Discharge: HOME | End: 2025-04-25
Attending: STUDENT IN AN ORGANIZED HEALTH CARE EDUCATION/TRAINING PROGRAM | Admitting: STUDENT IN AN ORGANIZED HEALTH CARE EDUCATION/TRAINING PROGRAM
Payer: COMMERCIAL

## 2025-04-22 DIAGNOSIS — Z3A.35 35 WEEKS GESTATION OF PREGNANCY: ICD-10-CM

## 2025-04-22 DIAGNOSIS — O30.033 MONOCHORIONIC DIAMNIOTIC TWIN GESTATION IN THIRD TRIMESTER: ICD-10-CM

## 2025-04-22 DIAGNOSIS — O40.3XX1 POLYHYDRAMNIOS IN THIRD TRIMESTER, FETUS 1: ICD-10-CM

## 2025-04-22 DIAGNOSIS — O34.219 PREVIOUS CESAREAN DELIVERY AFFECTING PREGNANCY: ICD-10-CM

## 2025-04-22 DIAGNOSIS — O36.5990 IUGR (INTRAUTERINE GROWTH RESTRICTION) AFFECTING CARE OF MOTHER: ICD-10-CM

## 2025-04-22 DIAGNOSIS — O36.5932 POOR FETAL GROWTH AFFECTING MANAGEMENT OF MOTHER IN THIRD TRIMESTER, FETUS 2 OF MULTIPLE GESTATION: ICD-10-CM

## 2025-04-22 DIAGNOSIS — O99.013 ANEMIA DURING PREGNANCY IN THIRD TRIMESTER: ICD-10-CM

## 2025-04-22 DIAGNOSIS — D69.3 IDIOPATHIC THROMBOCYTOPENIC PURPURA (ITP): ICD-10-CM

## 2025-04-22 DIAGNOSIS — O30.033 MONOCHORIONIC DIAMNIOTIC TWIN GESTATION IN THIRD TRIMESTER: Primary | ICD-10-CM

## 2025-04-22 DIAGNOSIS — O36.5930 POOR FETAL GROWTH AFFECTING MANAGEMENT OF MOTHER IN THIRD TRIMESTER: ICD-10-CM

## 2025-04-22 LAB
ABO + RH BLD: NORMAL
ABO + RH BLD: NORMAL
ANISOCYTOSIS BLD QL SMEAR: ABNORMAL
BASOPHILS # BLD AUTO: 0.01 K/UL (ref 0–0.2)
BASOPHILS NFR BLD AUTO: 0.1 % (ref 0–1)
BILIRUB UR QL STRIP: NEGATIVE
BLD PROD TYP BPU: NORMAL
BLD PROD TYP BPU: NORMAL
BLOOD UNIT EXPIRATION DATE: NORMAL
BLOOD UNIT EXPIRATION DATE: NORMAL
BLOOD UNIT TYPE CODE: 5100
BLOOD UNIT TYPE CODE: 5100
CLARITY UR: ABNORMAL
COLOR UR: ABNORMAL
CROSSMATCH INTERPRETATION: NORMAL
CROSSMATCH INTERPRETATION: NORMAL
DIFFERENTIAL METHOD BLD: ABNORMAL
DISPENSE STATUS: NORMAL
DISPENSE STATUS: NORMAL
EOSINOPHIL # BLD AUTO: 0.01 K/UL (ref 0–0.5)
EOSINOPHIL NFR BLD AUTO: 0.1 % (ref 1–4)
ERYTHROCYTE [DISTWIDTH] IN BLOOD BY AUTOMATED COUNT: 16.1 % (ref 11.5–14.5)
GLUCOSE UR STRIP-MCNC: NORMAL MG/DL
HCT VFR BLD AUTO: 28.6 % (ref 38–47)
HGB BLD-MCNC: 8 G/DL (ref 12–16)
HYPOCHROMIA BLD QL SMEAR: ABNORMAL
IMM GRANULOCYTES # BLD AUTO: 0.32 K/UL (ref 0–0.04)
IMM GRANULOCYTES NFR BLD: 3.7 % (ref 0–0.4)
INDIRECT COOMBS: NORMAL
KETONES UR STRIP-SCNC: NEGATIVE MG/DL
LEUKOCYTE ESTERASE UR QL STRIP: NEGATIVE
LYMPHOCYTES # BLD AUTO: 1.37 K/UL (ref 1–4.8)
LYMPHOCYTES NFR BLD AUTO: 15.9 % (ref 27–41)
LYMPHOCYTES NFR BLD MANUAL: 10 % (ref 27–41)
MCH RBC QN AUTO: 22.5 PG (ref 27–31)
MCHC RBC AUTO-ENTMCNC: 28 G/DL (ref 32–36)
MCV RBC AUTO: 80.3 FL (ref 80–96)
MONOCYTES # BLD AUTO: 0.51 K/UL (ref 0–0.8)
MONOCYTES NFR BLD AUTO: 5.9 % (ref 2–6)
MONOCYTES NFR BLD MANUAL: 5 % (ref 2–6)
MPC BLD CALC-MCNC: 11.9 FL (ref 9.4–12.4)
MUCOUS, UA: ABNORMAL /LPF
NEUTROPHILS # BLD AUTO: 6.42 K/UL (ref 1.8–7.7)
NEUTROPHILS NFR BLD AUTO: 74.3 % (ref 53–65)
NEUTS SEG NFR BLD MANUAL: 85 % (ref 50–62)
NITRITE UR QL STRIP: NEGATIVE
NRBC # BLD AUTO: 0.18 X10E3/UL
NRBC BLD MANUAL-RTO: 2 /100 WBC
NRBC, AUTO (.00): 2.1 %
PH UR STRIP: 7 PH UNITS
PLATELET # BLD AUTO: 49 K/UL (ref 150–400)
PLATELET MORPHOLOGY: ABNORMAL
POLYCHROMASIA BLD QL SMEAR: ABNORMAL
PROT UR QL STRIP: NEGATIVE
RBC # BLD AUTO: 3.56 M/UL (ref 4.2–5.4)
RBC # UR STRIP: ABNORMAL /UL
RBC #/AREA URNS HPF: 3 /HPF
RH BLD: NORMAL
SP GR UR STRIP: 1.01
SPECIMEN OUTDATE: NORMAL
SQUAMOUS #/AREA URNS LPF: ABNORMAL /HPF
SYPHILIS AB INTERPRETATION: NORMAL
UNIT NUMBER: NORMAL
UNIT NUMBER: NORMAL
UROBILINOGEN UR STRIP-ACNC: NORMAL MG/DL
WBC # BLD AUTO: 8.64 K/UL (ref 4.5–11)
WBC #/AREA URNS HPF: 3 /HPF

## 2025-04-22 PROCEDURE — 25000003 PHARM REV CODE 250: Performed by: STUDENT IN AN ORGANIZED HEALTH CARE EDUCATION/TRAINING PROGRAM

## 2025-04-22 PROCEDURE — 86900 BLOOD TYPING SEROLOGIC ABO: CPT | Performed by: ADVANCED PRACTICE MIDWIFE

## 2025-04-22 PROCEDURE — 81003 URINALYSIS AUTO W/O SCOPE: CPT | Performed by: STUDENT IN AN ORGANIZED HEALTH CARE EDUCATION/TRAINING PROGRAM

## 2025-04-22 PROCEDURE — 85025 COMPLETE CBC W/AUTO DIFF WBC: CPT | Performed by: ADVANCED PRACTICE MIDWIFE

## 2025-04-22 PROCEDURE — 86923 COMPATIBILITY TEST ELECTRIC: CPT | Mod: 91 | Performed by: STUDENT IN AN ORGANIZED HEALTH CARE EDUCATION/TRAINING PROGRAM

## 2025-04-22 PROCEDURE — 30233N1 TRANSFUSION OF NONAUTOLOGOUS RED BLOOD CELLS INTO PERIPHERAL VEIN, PERCUTANEOUS APPROACH: ICD-10-PCS | Performed by: STUDENT IN AN ORGANIZED HEALTH CARE EDUCATION/TRAINING PROGRAM

## 2025-04-22 PROCEDURE — 11000001 HC ACUTE MED/SURG PRIVATE ROOM

## 2025-04-22 PROCEDURE — P9016 RBC LEUKOCYTES REDUCED: HCPCS | Performed by: STUDENT IN AN ORGANIZED HEALTH CARE EDUCATION/TRAINING PROGRAM

## 2025-04-22 PROCEDURE — 36430 TRANSFUSION BLD/BLD COMPNT: CPT

## 2025-04-22 PROCEDURE — 36415 COLL VENOUS BLD VENIPUNCTURE: CPT | Performed by: ADVANCED PRACTICE MIDWIFE

## 2025-04-22 PROCEDURE — 86780 TREPONEMA PALLIDUM: CPT | Performed by: STUDENT IN AN ORGANIZED HEALTH CARE EDUCATION/TRAINING PROGRAM

## 2025-04-22 RX ORDER — SODIUM CITRATE AND CITRIC ACID MONOHYDRATE 334; 500 MG/5ML; MG/5ML
30 SOLUTION ORAL
Status: CANCELLED | OUTPATIENT
Start: 2025-04-22

## 2025-04-22 RX ORDER — OXYTOCIN-SODIUM CHLORIDE 0.9% IV SOLN 30 UNIT/500ML 30-0.9/5 UT/ML-%
10 SOLUTION INTRAVENOUS ONCE AS NEEDED
Status: DISCONTINUED | OUTPATIENT
Start: 2025-04-22 | End: 2025-04-25 | Stop reason: HOSPADM

## 2025-04-22 RX ORDER — SODIUM CHLORIDE, SODIUM LACTATE, POTASSIUM CHLORIDE, CALCIUM CHLORIDE 600; 310; 30; 20 MG/100ML; MG/100ML; MG/100ML; MG/100ML
INJECTION, SOLUTION INTRAVENOUS CONTINUOUS
Status: DISCONTINUED | OUTPATIENT
Start: 2025-04-22 | End: 2025-04-25

## 2025-04-22 RX ORDER — MISOPROSTOL 200 UG/1
800 TABLET ORAL
Status: DISCONTINUED | OUTPATIENT
Start: 2025-04-22 | End: 2025-04-25 | Stop reason: HOSPADM

## 2025-04-22 RX ORDER — METHYLERGONOVINE MALEATE 0.2 MG/ML
200 INJECTION INTRAVENOUS
Status: COMPLETED | OUTPATIENT
Start: 2025-04-22 | End: 2025-04-23

## 2025-04-22 RX ORDER — SODIUM CHLORIDE, SODIUM LACTATE, POTASSIUM CHLORIDE, CALCIUM CHLORIDE 600; 310; 30; 20 MG/100ML; MG/100ML; MG/100ML; MG/100ML
INJECTION, SOLUTION INTRAVENOUS CONTINUOUS
Status: CANCELLED | OUTPATIENT
Start: 2025-04-22

## 2025-04-22 RX ORDER — MUPIROCIN 20 MG/G
OINTMENT TOPICAL
Status: CANCELLED | OUTPATIENT
Start: 2025-04-22

## 2025-04-22 RX ORDER — OXYTOCIN-SODIUM CHLORIDE 0.9% IV SOLN 30 UNIT/500ML 30-0.9/5 UT/ML-%
10 SOLUTION INTRAVENOUS ONCE AS NEEDED
Status: CANCELLED | OUTPATIENT
Start: 2025-04-22 | End: 2036-09-18

## 2025-04-22 RX ORDER — CARBOPROST TROMETHAMINE 250 UG/ML
250 INJECTION, SOLUTION INTRAMUSCULAR
Status: COMPLETED | OUTPATIENT
Start: 2025-04-22 | End: 2025-04-23

## 2025-04-22 RX ORDER — FAMOTIDINE 10 MG/ML
20 INJECTION, SOLUTION INTRAVENOUS
Status: DISCONTINUED | OUTPATIENT
Start: 2025-04-22 | End: 2025-04-25 | Stop reason: HOSPADM

## 2025-04-22 RX ORDER — METHYLERGONOVINE MALEATE 0.2 MG/ML
200 INJECTION INTRAVENOUS
Status: CANCELLED | OUTPATIENT
Start: 2025-04-22

## 2025-04-22 RX ORDER — MUPIROCIN 20 MG/G
OINTMENT TOPICAL
Status: DISCONTINUED | OUTPATIENT
Start: 2025-04-22 | End: 2025-04-25 | Stop reason: HOSPADM

## 2025-04-22 RX ORDER — CEFAZOLIN SODIUM 2 G/50ML
2 SOLUTION INTRAVENOUS
Status: CANCELLED | OUTPATIENT
Start: 2025-04-23

## 2025-04-22 RX ORDER — CARBOPROST TROMETHAMINE 250 UG/ML
250 INJECTION, SOLUTION INTRAMUSCULAR
Status: CANCELLED | OUTPATIENT
Start: 2025-04-22

## 2025-04-22 RX ORDER — FAMOTIDINE 10 MG/ML
20 INJECTION, SOLUTION INTRAVENOUS
Status: CANCELLED | OUTPATIENT
Start: 2025-04-22

## 2025-04-22 RX ORDER — HYDROCODONE BITARTRATE AND ACETAMINOPHEN 500; 5 MG/1; MG/1
TABLET ORAL
Status: DISCONTINUED | OUTPATIENT
Start: 2025-04-22 | End: 2025-04-25 | Stop reason: HOSPADM

## 2025-04-22 RX ORDER — OXYTOCIN/0.9 % SODIUM CHLORIDE 15/250 ML
95 PLASTIC BAG, INJECTION (ML) INTRAVENOUS CONTINUOUS PRN
Status: CANCELLED | OUTPATIENT
Start: 2025-04-22

## 2025-04-22 RX ORDER — SODIUM CITRATE AND CITRIC ACID MONOHYDRATE 334; 500 MG/5ML; MG/5ML
30 SOLUTION ORAL
Status: DISCONTINUED | OUTPATIENT
Start: 2025-04-22 | End: 2025-04-25 | Stop reason: HOSPADM

## 2025-04-22 RX ORDER — OXYTOCIN/0.9 % SODIUM CHLORIDE 15/250 ML
95 PLASTIC BAG, INJECTION (ML) INTRAVENOUS CONTINUOUS PRN
Status: DISCONTINUED | OUTPATIENT
Start: 2025-04-22 | End: 2025-04-25 | Stop reason: HOSPADM

## 2025-04-22 RX ORDER — MISOPROSTOL 200 UG/1
800 TABLET ORAL
Status: CANCELLED | OUTPATIENT
Start: 2025-04-22

## 2025-04-22 RX ORDER — CEFAZOLIN 2 G/1
2 INJECTION, POWDER, FOR SOLUTION INTRAMUSCULAR; INTRAVENOUS
Status: COMPLETED | OUTPATIENT
Start: 2025-04-23 | End: 2025-04-23

## 2025-04-22 RX ADMIN — SODIUM CHLORIDE: 9 INJECTION, SOLUTION INTRAVENOUS at 07:04

## 2025-04-22 NOTE — DISCHARGE INSTRUCTIONS
"  Topic Page  Nurse Date Time Comments   PP Education Booklet Given ---- ke 2025 discharge booklet at bedside.        Skin to skin 18 ke     Rooming in 29 ke     Importance of Exclusive Breastfeeding for 6 mo 35 ke     Bleeding 6 ke     Incision Care 10 ke     Sex 11 ke     Pain Management 8 ke     Perineal Care 9 ke     Minimizing Engorgement 40 ke     Collection & Storage of BM 42-43 ke     S/S of BF Problems  ke     Hand Expression 42 ke     Maternal s/s breast problems 41 ke     Mgnt of Common BF Problems (engorgement, sore & cracked nipples) 40-41 ke     PPD/Anxiety 14-15 ke        Patient given "Your Guide to Postpartum and  Care" for education during this hospital stay. Booklet provided with information resources. Patient oriented on how to use QR codes and find topics in book, and oriented to flip book in patient room. Patient verbalized that they are able to read and understand material provided to them. Instructed to call nurses for questions. Mom educated on benefits of skin to skin for at least 1 hour after delivery, the benefits of rooming in with infant in helping mom to learn and respond to feeding cues, caring for their infant, and bonding, and the importance on exclusive breastfeeding for the first 6 months.     "

## 2025-04-23 ENCOUNTER — ANESTHESIA EVENT (OUTPATIENT)
Dept: OBSTETRICS AND GYNECOLOGY | Facility: HOSPITAL | Age: 30
End: 2025-04-23
Payer: COMMERCIAL

## 2025-04-23 ENCOUNTER — ANESTHESIA (OUTPATIENT)
Dept: OBSTETRICS AND GYNECOLOGY | Facility: HOSPITAL | Age: 30
End: 2025-04-23
Payer: COMMERCIAL

## 2025-04-23 PROBLEM — O99.013 ANEMIA DURING PREGNANCY IN THIRD TRIMESTER: Status: ACTIVE | Noted: 2025-04-23

## 2025-04-23 PROBLEM — O36.5930 POOR FETAL GROWTH AFFECTING MANAGEMENT OF MOTHER IN THIRD TRIMESTER: Status: ACTIVE | Noted: 2025-04-23

## 2025-04-23 PROBLEM — O40.3XX1 POLYHYDRAMNIOS IN THIRD TRIMESTER, FETUS 1: Status: ACTIVE | Noted: 2025-04-23

## 2025-04-23 PROBLEM — Z3A.35 35 WEEKS GESTATION OF PREGNANCY: Status: ACTIVE | Noted: 2025-04-23

## 2025-04-23 PROBLEM — O30.033 MONOCHORIONIC DIAMNIOTIC TWIN GESTATION IN THIRD TRIMESTER: Status: ACTIVE | Noted: 2025-04-23

## 2025-04-23 LAB
BASOPHILS # BLD AUTO: 0.01 K/UL (ref 0–0.2)
BASOPHILS NFR BLD AUTO: 0.1 % (ref 0–1)
DIFFERENTIAL METHOD BLD: ABNORMAL
EOSINOPHIL # BLD AUTO: 0.01 K/UL (ref 0–0.5)
EOSINOPHIL NFR BLD AUTO: 0.1 % (ref 1–4)
EOSINOPHIL NFR BLD MANUAL: 1 % (ref 1–4)
ERYTHROCYTE [DISTWIDTH] IN BLOOD BY AUTOMATED COUNT: 16.2 % (ref 11.5–14.5)
HCT VFR BLD AUTO: 30.4 % (ref 38–47)
HGB BLD-MCNC: 9.2 G/DL (ref 12–16)
IMM GRANULOCYTES # BLD AUTO: 0.29 K/UL (ref 0–0.04)
IMM GRANULOCYTES NFR BLD: 3 % (ref 0–0.4)
LYMPHOCYTES # BLD AUTO: 1.95 K/UL (ref 1–4.8)
LYMPHOCYTES NFR BLD AUTO: 20.1 % (ref 27–41)
LYMPHOCYTES NFR BLD MANUAL: 25 % (ref 27–41)
MCH RBC QN AUTO: 24 PG (ref 27–31)
MCHC RBC AUTO-ENTMCNC: 30.3 G/DL (ref 32–36)
MCV RBC AUTO: 79.2 FL (ref 80–96)
MONOCYTES # BLD AUTO: 0.86 K/UL (ref 0–0.8)
MONOCYTES NFR BLD AUTO: 8.9 % (ref 2–6)
MONOCYTES NFR BLD MANUAL: 5 % (ref 2–6)
MPC BLD CALC-MCNC: ABNORMAL G/DL
NEUTROPHILS # BLD AUTO: 6.57 K/UL (ref 1.8–7.7)
NEUTROPHILS NFR BLD AUTO: 67.8 % (ref 53–65)
NEUTS BAND NFR BLD MANUAL: 2 % (ref 1–5)
NEUTS SEG NFR BLD MANUAL: 67 % (ref 50–62)
NRBC # BLD AUTO: 0.2 X10E3/UL
NRBC BLD MANUAL-RTO: 2 /100 WBC
NRBC, AUTO (.00): 2.1 %
PLATELET # BLD AUTO: 45 K/UL (ref 150–400)
PLATELET MORPHOLOGY: ABNORMAL
POLYCHROMASIA BLD QL SMEAR: ABNORMAL
RBC # BLD AUTO: 3.84 M/UL (ref 4.2–5.4)
WBC # BLD AUTO: 9.69 K/UL (ref 4.5–11)

## 2025-04-23 PROCEDURE — 25000003 PHARM REV CODE 250: Performed by: NURSE ANESTHETIST, CERTIFIED REGISTERED

## 2025-04-23 PROCEDURE — 59514 CESAREAN DELIVERY ONLY: CPT | Mod: AS,22,SC, | Performed by: ADVANCED PRACTICE MIDWIFE

## 2025-04-23 PROCEDURE — 51702 INSERT TEMP BLADDER CATH: CPT

## 2025-04-23 PROCEDURE — 25000003 PHARM REV CODE 250: Performed by: ADVANCED PRACTICE MIDWIFE

## 2025-04-23 PROCEDURE — 86923 COMPATIBILITY TEST ELECTRIC: CPT | Performed by: ADVANCED PRACTICE MIDWIFE

## 2025-04-23 PROCEDURE — 59510 CESAREAN DELIVERY: CPT | Mod: 22,SC,, | Performed by: STUDENT IN AN ORGANIZED HEALTH CARE EDUCATION/TRAINING PROGRAM

## 2025-04-23 PROCEDURE — 36415 COLL VENOUS BLD VENIPUNCTURE: CPT | Performed by: STUDENT IN AN ORGANIZED HEALTH CARE EDUCATION/TRAINING PROGRAM

## 2025-04-23 PROCEDURE — 71000039 HC RECOVERY, EACH ADD'L HOUR: Performed by: STUDENT IN AN ORGANIZED HEALTH CARE EDUCATION/TRAINING PROGRAM

## 2025-04-23 PROCEDURE — 85025 COMPLETE CBC W/AUTO DIFF WBC: CPT | Performed by: STUDENT IN AN ORGANIZED HEALTH CARE EDUCATION/TRAINING PROGRAM

## 2025-04-23 PROCEDURE — 36004724 HC OB OR TIME LEV III - 1ST 15 MIN: Performed by: STUDENT IN AN ORGANIZED HEALTH CARE EDUCATION/TRAINING PROGRAM

## 2025-04-23 PROCEDURE — 27201423 OPTIME MED/SURG SUP & DEVICES STERILE SUPPLY: Performed by: STUDENT IN AN ORGANIZED HEALTH CARE EDUCATION/TRAINING PROGRAM

## 2025-04-23 PROCEDURE — 25000003 PHARM REV CODE 250: Performed by: STUDENT IN AN ORGANIZED HEALTH CARE EDUCATION/TRAINING PROGRAM

## 2025-04-23 PROCEDURE — 27000716 HC OXISENSOR PROBE, ANY SIZE: Performed by: NURSE ANESTHETIST, CERTIFIED REGISTERED

## 2025-04-23 PROCEDURE — 99900035 HC TECH TIME PER 15 MIN (STAT)

## 2025-04-23 PROCEDURE — 27000689 HC BLADE LARYNGOSCOPE ANY SIZE: Performed by: NURSE ANESTHETIST, CERTIFIED REGISTERED

## 2025-04-23 PROCEDURE — 36004725 HC OB OR TIME LEV III - EA ADD 15 MIN: Performed by: STUDENT IN AN ORGANIZED HEALTH CARE EDUCATION/TRAINING PROGRAM

## 2025-04-23 PROCEDURE — 71000033 HC RECOVERY, INTIAL HOUR: Performed by: STUDENT IN AN ORGANIZED HEALTH CARE EDUCATION/TRAINING PROGRAM

## 2025-04-23 PROCEDURE — 11000001 HC ACUTE MED/SURG PRIVATE ROOM

## 2025-04-23 PROCEDURE — 63600175 PHARM REV CODE 636 W HCPCS: Performed by: ADVANCED PRACTICE MIDWIFE

## 2025-04-23 PROCEDURE — 25000003 PHARM REV CODE 250: Performed by: ANESTHESIOLOGY

## 2025-04-23 PROCEDURE — 27000165 HC TUBE, ETT CUFFED: Performed by: NURSE ANESTHETIST, CERTIFIED REGISTERED

## 2025-04-23 PROCEDURE — 63600175 PHARM REV CODE 636 W HCPCS: Performed by: NURSE ANESTHETIST, CERTIFIED REGISTERED

## 2025-04-23 PROCEDURE — D9220A PRA ANESTHESIA: Mod: CRNA,,, | Performed by: NURSE ANESTHETIST, CERTIFIED REGISTERED

## 2025-04-23 PROCEDURE — 37000009 HC ANESTHESIA EA ADD 15 MINS: Performed by: STUDENT IN AN ORGANIZED HEALTH CARE EDUCATION/TRAINING PROGRAM

## 2025-04-23 PROCEDURE — D9220A PRA ANESTHESIA: Mod: ANES,,, | Performed by: ANESTHESIOLOGY

## 2025-04-23 PROCEDURE — 27000655: Performed by: NURSE ANESTHETIST, CERTIFIED REGISTERED

## 2025-04-23 PROCEDURE — 27200700 HC TUBE, ENDOTRACHEAL NASAL RAE: Performed by: NURSE ANESTHETIST, CERTIFIED REGISTERED

## 2025-04-23 PROCEDURE — 27000510 HC BLANKET BAIR HUGGER ANY SIZE: Performed by: NURSE ANESTHETIST, CERTIFIED REGISTERED

## 2025-04-23 PROCEDURE — 63600175 PHARM REV CODE 636 W HCPCS: Performed by: STUDENT IN AN ORGANIZED HEALTH CARE EDUCATION/TRAINING PROGRAM

## 2025-04-23 PROCEDURE — 37000008 HC ANESTHESIA 1ST 15 MINUTES: Performed by: STUDENT IN AN ORGANIZED HEALTH CARE EDUCATION/TRAINING PROGRAM

## 2025-04-23 RX ORDER — SUCCINYLCHOLINE CHLORIDE 20 MG/ML
INJECTION INTRAMUSCULAR; INTRAVENOUS
Status: DISCONTINUED | OUTPATIENT
Start: 2025-04-23 | End: 2025-04-23

## 2025-04-23 RX ORDER — ACETAMINOPHEN 325 MG/1
650 TABLET ORAL EVERY 6 HOURS
Status: DISPENSED | OUTPATIENT
Start: 2025-04-23 | End: 2025-04-24

## 2025-04-23 RX ORDER — KETOROLAC TROMETHAMINE 30 MG/ML
30 INJECTION, SOLUTION INTRAMUSCULAR; INTRAVENOUS EVERY 8 HOURS
Status: DISCONTINUED | OUTPATIENT
Start: 2025-04-23 | End: 2025-04-23

## 2025-04-23 RX ORDER — TRANEXAMIC ACID 100 MG/ML
INJECTION, SOLUTION INTRAVENOUS
Status: DISCONTINUED | OUTPATIENT
Start: 2025-04-23 | End: 2025-04-23

## 2025-04-23 RX ORDER — OXYTOCIN 10 [USP'U]/ML
INJECTION, SOLUTION INTRAMUSCULAR; INTRAVENOUS
Status: DISCONTINUED | OUTPATIENT
Start: 2025-04-23 | End: 2025-04-23

## 2025-04-23 RX ORDER — DOCUSATE SODIUM 100 MG/1
200 CAPSULE, LIQUID FILLED ORAL 2 TIMES DAILY
Status: DISCONTINUED | OUTPATIENT
Start: 2025-04-23 | End: 2025-04-24

## 2025-04-23 RX ORDER — MUPIROCIN 20 MG/G
OINTMENT TOPICAL 2 TIMES DAILY
Status: DISCONTINUED | OUTPATIENT
Start: 2025-04-23 | End: 2025-04-25 | Stop reason: HOSPADM

## 2025-04-23 RX ORDER — ADHESIVE BANDAGE
30 BANDAGE TOPICAL 2 TIMES DAILY PRN
Status: DISCONTINUED | OUTPATIENT
Start: 2025-04-24 | End: 2025-04-25 | Stop reason: HOSPADM

## 2025-04-23 RX ORDER — KETOROLAC TROMETHAMINE 30 MG/ML
30 INJECTION, SOLUTION INTRAMUSCULAR; INTRAVENOUS EVERY 6 HOURS
Status: DISCONTINUED | OUTPATIENT
Start: 2025-04-23 | End: 2025-04-23

## 2025-04-23 RX ORDER — ONDANSETRON HYDROCHLORIDE 2 MG/ML
4 INJECTION, SOLUTION INTRAVENOUS EVERY 6 HOURS PRN
Status: ACTIVE | OUTPATIENT
Start: 2025-04-23 | End: 2025-04-24

## 2025-04-23 RX ORDER — OXYTOCIN/0.9 % SODIUM CHLORIDE 15/250 ML
95 PLASTIC BAG, INJECTION (ML) INTRAVENOUS ONCE AS NEEDED
Status: COMPLETED | OUTPATIENT
Start: 2025-04-23 | End: 2025-04-23

## 2025-04-23 RX ORDER — OXYCODONE HYDROCHLORIDE 5 MG/1
5 TABLET ORAL EVERY 4 HOURS PRN
Status: ACTIVE | OUTPATIENT
Start: 2025-04-23 | End: 2025-04-24

## 2025-04-23 RX ORDER — OXYCODONE AND ACETAMINOPHEN 10; 325 MG/1; MG/1
1 TABLET ORAL EVERY 4 HOURS PRN
Status: DISCONTINUED | OUTPATIENT
Start: 2025-04-24 | End: 2025-04-25 | Stop reason: HOSPADM

## 2025-04-23 RX ORDER — OXYTOCIN 10 [USP'U]/ML
10 INJECTION, SOLUTION INTRAMUSCULAR; INTRAVENOUS ONCE AS NEEDED
Status: DISCONTINUED | OUTPATIENT
Start: 2025-04-23 | End: 2025-04-25 | Stop reason: HOSPADM

## 2025-04-23 RX ORDER — TRANEXAMIC ACID 10 MG/ML
1000 INJECTION, SOLUTION INTRAVENOUS EVERY 30 MIN PRN
Status: DISCONTINUED | OUTPATIENT
Start: 2025-04-23 | End: 2025-04-25 | Stop reason: HOSPADM

## 2025-04-23 RX ORDER — CEFAZOLIN SODIUM 1 G/3ML
1 INJECTION, POWDER, FOR SOLUTION INTRAMUSCULAR; INTRAVENOUS
Status: COMPLETED | OUTPATIENT
Start: 2025-04-23 | End: 2025-04-24

## 2025-04-23 RX ORDER — METHYLERGONOVINE MALEATE 0.2 MG/ML
200 INJECTION INTRAVENOUS ONCE AS NEEDED
Status: DISCONTINUED | OUTPATIENT
Start: 2025-04-23 | End: 2025-04-25 | Stop reason: HOSPADM

## 2025-04-23 RX ORDER — HYDROMORPHONE HCL IN 0.9% NACL 6 MG/30 ML
PATIENT CONTROLLED ANALGESIA SYRINGE INTRAVENOUS CONTINUOUS
Refills: 0 | Status: DISCONTINUED | OUTPATIENT
Start: 2025-04-23 | End: 2025-04-25

## 2025-04-23 RX ORDER — SIMETHICONE 80 MG
1 TABLET,CHEWABLE ORAL EVERY 6 HOURS PRN
Status: DISCONTINUED | OUTPATIENT
Start: 2025-04-23 | End: 2025-04-25 | Stop reason: HOSPADM

## 2025-04-23 RX ORDER — SODIUM CHLORIDE 0.9 % (FLUSH) 0.9 %
10 SYRINGE (ML) INJECTION
Status: DISCONTINUED | OUTPATIENT
Start: 2025-04-23 | End: 2025-04-25 | Stop reason: HOSPADM

## 2025-04-23 RX ORDER — HYDROCODONE BITARTRATE AND ACETAMINOPHEN 500; 5 MG/1; MG/1
TABLET ORAL
Status: DISCONTINUED | OUTPATIENT
Start: 2025-04-23 | End: 2025-04-25 | Stop reason: HOSPADM

## 2025-04-23 RX ORDER — DIPHENHYDRAMINE HCL 25 MG
25 CAPSULE ORAL EVERY 4 HOURS PRN
Status: DISCONTINUED | OUTPATIENT
Start: 2025-04-23 | End: 2025-04-25 | Stop reason: HOSPADM

## 2025-04-23 RX ORDER — MORPHINE SULFATE 1 MG/ML
INJECTION, SOLUTION EPIDURAL; INTRATHECAL; INTRAVENOUS
Status: DISCONTINUED | OUTPATIENT
Start: 2025-04-23 | End: 2025-04-23

## 2025-04-23 RX ORDER — MISOPROSTOL 200 UG/1
800 TABLET ORAL ONCE AS NEEDED
Status: DISCONTINUED | OUTPATIENT
Start: 2025-04-23 | End: 2025-04-25 | Stop reason: HOSPADM

## 2025-04-23 RX ORDER — AMOXICILLIN 250 MG
1 CAPSULE ORAL NIGHTLY PRN
Status: DISCONTINUED | OUTPATIENT
Start: 2025-04-23 | End: 2025-04-24

## 2025-04-23 RX ORDER — ROCURONIUM BROMIDE 10 MG/ML
INJECTION, SOLUTION INTRAVENOUS
Status: DISCONTINUED | OUTPATIENT
Start: 2025-04-23 | End: 2025-04-23

## 2025-04-23 RX ORDER — PRENATAL WITH FERROUS FUM AND FOLIC ACID 3080; 920; 120; 400; 22; 1.84; 3; 20; 10; 1; 12; 200; 27; 25; 2 [IU]/1; [IU]/1; MG/1; [IU]/1; MG/1; MG/1; MG/1; MG/1; MG/1; MG/1; UG/1; MG/1; MG/1; MG/1; MG/1
1 TABLET ORAL DAILY
Status: DISCONTINUED | OUTPATIENT
Start: 2025-04-23 | End: 2025-04-25 | Stop reason: HOSPADM

## 2025-04-23 RX ORDER — OXYCODONE AND ACETAMINOPHEN 5; 325 MG/1; MG/1
1 TABLET ORAL EVERY 4 HOURS PRN
Status: DISCONTINUED | OUTPATIENT
Start: 2025-04-24 | End: 2025-04-25 | Stop reason: HOSPADM

## 2025-04-23 RX ORDER — CARBOPROST TROMETHAMINE 250 UG/ML
250 INJECTION, SOLUTION INTRAMUSCULAR
Status: DISCONTINUED | OUTPATIENT
Start: 2025-04-23 | End: 2025-04-25 | Stop reason: HOSPADM

## 2025-04-23 RX ORDER — OXYCODONE HYDROCHLORIDE 5 MG/1
10 TABLET ORAL EVERY 4 HOURS PRN
Status: ACTIVE | OUTPATIENT
Start: 2025-04-23 | End: 2025-04-24

## 2025-04-23 RX ORDER — OXYTOCIN-SODIUM CHLORIDE 0.9% IV SOLN 30 UNIT/500ML 30-0.9/5 UT/ML-%
10 SOLUTION INTRAVENOUS ONCE AS NEEDED
Status: DISCONTINUED | OUTPATIENT
Start: 2025-04-23 | End: 2025-04-25 | Stop reason: HOSPADM

## 2025-04-23 RX ORDER — ONDANSETRON 4 MG/1
8 TABLET, ORALLY DISINTEGRATING ORAL EVERY 8 HOURS PRN
Status: DISCONTINUED | OUTPATIENT
Start: 2025-04-23 | End: 2025-04-25 | Stop reason: HOSPADM

## 2025-04-23 RX ORDER — BISACODYL 10 MG/1
10 SUPPOSITORY RECTAL ONCE AS NEEDED
Status: DISCONTINUED | OUTPATIENT
Start: 2025-04-23 | End: 2025-04-25 | Stop reason: HOSPADM

## 2025-04-23 RX ORDER — DIPHENOXYLATE HYDROCHLORIDE AND ATROPINE SULFATE 2.5; .025 MG/1; MG/1
2 TABLET ORAL EVERY 6 HOURS PRN
Status: DISCONTINUED | OUTPATIENT
Start: 2025-04-23 | End: 2025-04-25 | Stop reason: HOSPADM

## 2025-04-23 RX ORDER — IBUPROFEN 800 MG/1
800 TABLET ORAL EVERY 8 HOURS
Status: DISCONTINUED | OUTPATIENT
Start: 2025-04-24 | End: 2025-04-23

## 2025-04-23 RX ORDER — PROPOFOL 10 MG/ML
VIAL (ML) INTRAVENOUS
Status: DISCONTINUED | OUTPATIENT
Start: 2025-04-23 | End: 2025-04-23

## 2025-04-23 RX ORDER — ONDANSETRON HYDROCHLORIDE 2 MG/ML
INJECTION, SOLUTION INTRAVENOUS
Status: DISCONTINUED | OUTPATIENT
Start: 2025-04-23 | End: 2025-04-23

## 2025-04-23 RX ORDER — NALOXONE HCL 0.4 MG/ML
0.02 VIAL (ML) INJECTION
Status: DISCONTINUED | OUTPATIENT
Start: 2025-04-23 | End: 2025-04-25 | Stop reason: HOSPADM

## 2025-04-23 RX ORDER — OXYTOCIN/0.9 % SODIUM CHLORIDE 15/250 ML
95 PLASTIC BAG, INJECTION (ML) INTRAVENOUS CONTINUOUS PRN
Status: DISCONTINUED | OUTPATIENT
Start: 2025-04-23 | End: 2025-04-25 | Stop reason: HOSPADM

## 2025-04-23 RX ORDER — ETOMIDATE 2 MG/ML
INJECTION INTRAVENOUS
Status: DISCONTINUED | OUTPATIENT
Start: 2025-04-23 | End: 2025-04-23

## 2025-04-23 RX ADMIN — SODIUM CHLORIDE, POTASSIUM CHLORIDE, SODIUM LACTATE AND CALCIUM CHLORIDE: 600; 310; 30; 20 INJECTION, SOLUTION INTRAVENOUS at 04:04

## 2025-04-23 RX ADMIN — SODIUM CHLORIDE, POTASSIUM CHLORIDE, SODIUM LACTATE AND CALCIUM CHLORIDE: 600; 310; 30; 20 INJECTION, SOLUTION INTRAVENOUS at 09:04

## 2025-04-23 RX ADMIN — SODIUM CITRATE AND CITRIC ACID MONOHYDRATE 30 ML: 500; 334 SOLUTION ORAL at 12:04

## 2025-04-23 RX ADMIN — CEFAZOLIN 1 G: 330 INJECTION, POWDER, FOR SOLUTION INTRAMUSCULAR; INTRAVENOUS at 09:04

## 2025-04-23 RX ADMIN — DIPHENHYDRAMINE HYDROCHLORIDE 25 MG: 25 CAPSULE ORAL at 03:04

## 2025-04-23 RX ADMIN — ONDANSETRON 4 MG: 2 INJECTION INTRAMUSCULAR; INTRAVENOUS at 12:04

## 2025-04-23 RX ADMIN — SODIUM CHLORIDE, POTASSIUM CHLORIDE, SODIUM LACTATE AND CALCIUM CHLORIDE: 600; 310; 30; 20 INJECTION, SOLUTION INTRAVENOUS at 06:04

## 2025-04-23 RX ADMIN — FAMOTIDINE 20 MG: 10 INJECTION, SOLUTION INTRAVENOUS at 05:04

## 2025-04-23 RX ADMIN — CEFAZOLIN 2 G: 2 INJECTION, POWDER, FOR SOLUTION INTRAMUSCULAR; INTRAVENOUS at 12:04

## 2025-04-23 RX ADMIN — SUCCINYLCHOLINE CHLORIDE 100 MG: 20 INJECTION, SOLUTION INTRAMUSCULAR; INTRAVENOUS; PARENTERAL at 12:04

## 2025-04-23 RX ADMIN — PROPOFOL 25 MG: 10 INJECTION, EMULSION INTRAVENOUS at 12:04

## 2025-04-23 RX ADMIN — DOCUSATE SODIUM 200 MG: 100 CAPSULE, LIQUID FILLED ORAL at 09:04

## 2025-04-23 RX ADMIN — METHYLERGONOVINE MALEATE 200 MCG: 0.2 INJECTION, SOLUTION INTRAMUSCULAR; INTRAVENOUS at 02:04

## 2025-04-23 RX ADMIN — SUGAMMADEX 200 MG: 100 INJECTION, SOLUTION INTRAVENOUS at 01:04

## 2025-04-23 RX ADMIN — OXYTOCIN 15 UNITS: 10 INJECTION, SOLUTION INTRAMUSCULAR; INTRAVENOUS at 01:04

## 2025-04-23 RX ADMIN — MORPHINE SULFATE 5 MG: 1 INJECTION, SOLUTION EPIDURAL; INTRATHECAL; INTRAVENOUS at 12:04

## 2025-04-23 RX ADMIN — ROCURONIUM BROMIDE 10 MG: 10 INJECTION, SOLUTION INTRAVENOUS at 12:04

## 2025-04-23 RX ADMIN — ETOMIDATE 15 MG: 2 INJECTION INTRAVENOUS at 12:04

## 2025-04-23 RX ADMIN — OXYTOCIN 95 MILLI-UNITS/MIN: 10 INJECTION INTRAVENOUS at 01:04

## 2025-04-23 RX ADMIN — TRANEXAMIC ACID 1000 MG: 100 INJECTION, SOLUTION INTRAVENOUS at 12:04

## 2025-04-23 RX ADMIN — SODIUM CHLORIDE, POTASSIUM CHLORIDE, SODIUM LACTATE AND CALCIUM CHLORIDE 1000 ML: 600; 310; 30; 20 INJECTION, SOLUTION INTRAVENOUS at 05:04

## 2025-04-23 RX ADMIN — ACETAMINOPHEN 650 MG: 325 TABLET ORAL at 05:04

## 2025-04-23 RX ADMIN — OXYTOCIN 15 UNITS: 10 INJECTION, SOLUTION INTRAMUSCULAR; INTRAVENOUS at 12:04

## 2025-04-23 RX ADMIN — Medication: at 01:04

## 2025-04-23 NOTE — ANESTHESIA PROCEDURE NOTES
Intubation    Date/Time: 4/23/2025 12:33 PM    Performed by: Uyen Pate CRNA  Authorized by: Tyler Paul MD    Intubation:     Induction:  Rapid sequence induction    Intubated:  Postinduction    Mask Ventilation:  Not attempted    Attempts:  1    Attempted By:  CRNA    Method of Intubation:  Direct    Blade:  Shannon 2    Laryngeal View Grade: Grade I - full view of cords      Difficult Airway Encountered?: No      Complications:  None    Airway Device:  Oral endotracheal tube    Airway Device Size:  7.0    Style/Cuff Inflation:  Cuffed (inflated to minimal occlusive pressure)    Inflation Amount (mL):  6    Tube secured:  19    Secured at:  The lips    Placement Verified By:  Capnometry    Findings Post-Intubation:  BS equal bilateral and atraumatic/condition of teeth unchanged

## 2025-04-23 NOTE — TRANSFER OF CARE
"Anesthesia Transfer of Care Note    Patient: Dominique Tomlin    Procedure(s) Performed: Procedure(s) (LRB):   SECTION (N/A)    Patient location: Labor and Delivery    Anesthesia Type: general    Transport from OR: Transported from OR on room air with adequate spontaneous ventilation    Post pain: adequate analgesia    Post assessment: no apparent anesthetic complications and tolerated procedure well    Post vital signs: stable    Level of consciousness: responds to stimulation and awake    Nausea/Vomiting: no nausea/vomiting    Complications: none    Transfer of care protocol was followed    Last vitals: Visit Vitals  BP (!) 102/56 (BP Location: Left arm, Patient Position: Lying)   Pulse 66   Temp 36.7 °C (98 °F) (Skin)   Resp 20   Ht 4' 9.99" (1.473 m)   Wt 78.9 kg (173 lb 14.4 oz)   LMP 2024   SpO2 (!) 9%   Breastfeeding No   BMI 36.36 kg/m²     "

## 2025-04-23 NOTE — PLAN OF CARE
[facility] -  Labor and Delivery  OB Initial Discharge Assessment       Primary Care Provider: Anita Celis MD    Expected Discharge Date:     Initial Assessment (most recent)       OB Discharge Planning Assessment - 25 0851          OB Discharge Planning Assessment    Assessment Type Discharge Planning Assessment     Source of Information patient     People in Home spouse;child(esme), dependent     Name(s) of People in Home Negrito Tomlin, , [phone]; daughter     Does baby have crib or safe sleep space? Yes     Do you have a car seat? Yes     Has other essential care items? Clothing;Bottles;Diapers     DCFS No indications (Indicators for Report)     Discharge Plan A Home with family     Discharge Plan B Home with family                   SS spoke with pt, pt has all needs for expected  twins. Pt submitted to [employer] portal. Ss following         Healthcare Directives:   Advance Directive  (If Adv Dir status is received, view document under Adv Dir in header or Chart Review Media tab): Patient does not have Advance Directive, declines information.

## 2025-04-23 NOTE — L&D DELIVERY NOTE
[facility] -  Labor and Delivery   Section   Operative Note    SUMMARY     Date of Procedure: 2025     Procedure: Procedure(s) (LRB):   SECTION (N/A)    Surgeons and Role:     * Jermaine Sosa DO - Primary    Assistant: Nunu Chandra CNM    Pre-Operative Diagnosis: Monochorionic diamniotic twin gestation in third trimester [O30.033]  Previous  delivery affecting pregnancy [O34.219]  Idiopathic thrombocytopenic purpura (ITP) [D69.3]    Post-Operative Diagnosis: Post-Op Diagnosis Codes:     * Monochorionic diamniotic twin gestation in third trimester [O30.033]     * Previous  delivery affecting pregnancy [O34.219]     * Idiopathic thrombocytopenic purpura (ITP) [D69.3]    Anesthesia: GETA    Technical Procedures Used: RLTCS           Description of the Findings of the Procedure: mild fascial adhesive disease, Baby B viable male  presenting first from maternal right in cephalic presentation weighing 4lb 2oz, Baby A viable male  presenting second from maternal left in cephalic presentation weighing 4lb 12oz    Significant Surgical Tasks Conducted by the Assistant(s), if Applicable: retraction, visualization    Complications: No    Blood Loss: see QBL     Patient was taken to OR with IVF running. With patient in supine position, the legs are  and Jain Catheter placed and positioning to supine done.   Abdomen prepped with Chloroprep and 3 minute drying time allowed prior to draping of the abdomen. Time out taken with OR team members.General anesthesia was administered per anesthesia team.    Pfannenstiel Incision made through the skin, transverse fascial incision developed, rectus muscles  in the midline and the peritoneum entered.   no adhesions noted.    Florentino retractor was inserted into the abdominal cavity.  The lower uterine segment and position of the fetus identified.   Bladder flap taken down through transverse peritoneal  incision.    Low Transverse Incision made through well developed lower uterine segment and extended laterally with blunt dissection.   Clear fluid noted.  Baby B delivered from vertex presentation.  Cord clamped after one minute and  handed to attending nurse.  Cord blood taken. AROM performed on Baby A which then delivered from vertex presentation. Cord clamped after one minute and  handed to attending nurse.  Cord blood taken, placenta delivered.  The uterus was cleared of all clot and debris.  The edges of the uterine incision are grasped with ring clamps at the angles and the inferior midline edge of the incision.    Closure with running lock 0 monocryl, starting at left angle, tying at right angle.  A second imbricating layer was placed with 0 monocryl.   Observation for bleeding with suture of any bleeding along the hysterotomy line.   With good hemostasis noted, the anterior pelvis is rinsed with sterile saline. Seprafilm placed over hysterotomy.    Florentino retractor was removed from the abdomen.   Closure of the abdomen with 3-0 monocryl running of the peritoneum.  Rectus muscle was reapproximated with 0 chromic.  Fascia was closed with 0 looped PDS starting at the left angle and tying the knot at the right angle.  Subcutaneous tissue was reapproximated with 2-0 plain gut.  Skin closure with Insorb staples.  Wound dressed with dermabond, pressure dressing.     Of note, patient did receive TXA upon closure of hysterotomy.         Specimens:   Specimen (24h ago, onward)      None            Condition: Good    VTE Risk Mitigation (From admission, onward)           Ordered     Place sequential compression device  Until discontinued         25 1639     IP VTE LOW RISK PATIENT  Once         25 163                    Disposition: PACU - hemodynamically stable.    Attestation: Francisco Rae [[id number]]   Delivery Information for Francisco Leyvalyn Nabor    Birth  "information:  YOB: 2023   Time of birth: 12:34 PM   Sex: male   Head Delivery Date/Time: 2025 12:34 PM   Delivery type: , Low Transverse   Gestational Age: 35w6d       Delivery Providers    Delivering clinician: Jermaine Sosa DO   Provider Role    Nunu Chandra CNM               Measurements    Weight: 1920 g  Weight (lbs): 4 lb 3.7 oz  Length: 40.6 cm  Length (in): 16"  Head circumference: 32 cm  Chest circumference: 27 cm  Abdominal girth: 27 cm         Apgars    Living status: Living  Apgar Component Scores:  1 min.:  5 min.:  10 min.:  15 min.:  20 min.:    Skin color:  0  1       Heart rate:  2  2       Reflex irritability:  2  2       Muscle tone:  2  2       Respiratory effort:  2  2       Total:  8  9       Apgars assigned by: ROBERTO ROOT         Operative Delivery    Forceps attempted?: No  Vacuum extractor attempted?: No         Shoulder Dystocia    Shoulder dystocia present?: No           Presentation    Presentation: Vertex  Position: Middle Occiput Anterior           Interventions/Resuscitation    Method: Bulb Suctioning, Tactile Stimulation, NICU Attended       Cord    Vessels: 3 vessels  Complications: None  Delayed Cord Clamping?: Yes  Cord Blood Disposition: Lab  Gases Sent?: Yes  Stem Cell Collection (by MD): No       Placenta    Placenta delivery date/time: 2025 12:38  Placenta removal: Manual removal  Placenta appearance: Intact  Placenta disposition: Donation           Labor Events:       labor: No     Labor Onset Date/Time:         Dilation Complete Date/Time:         Start Pushing Date/Time:       Rupture Date/Time:            Rupture type:          Fluid Amount:       Fluid Color:        steroids: Full Course     Antibiotics given for GBS: No     Induction:       Indications for induction:        Augmentation:       Indications for augmentation:       Labor complications: None     Additional complications:          Cervical " "ripening:                     Delivery:      Episiotomy:       Indication for Episiotomy:       Perineal Lacerations:   Repaired:      Periurethral Laceration:   Repaired:     Labial Laceration:   Repaired:     Sulcus Laceration:   Repaired:     Vaginal Laceration:   Repaired:     Cervical Laceration:   Repaired:     Repair suture:       Repair # of packets:       Last Value - EBL - Nursing (mL):       Sum - EBL - Nursing (mL): 0     Last Value - EBL - Anesthesia (mL):      Calculated QBL (mL): 613     Running total QBL (mL): 613     Vaginal Sweep Performed:       Surgicount Correct:         Other providers:       Anesthesia    Method: General          Details (if applicable):  Trial of Labor No    Categorization: Repeat    Priority: Routine   Indications for : Repeat Section;Multiple Gestation   Incision Type: low transverse     Additional  information:  Forceps:    Vacuum:    Breech:    Observed anomalies    Other (Comments):            EV Tomlin [[id number]]   Delivery Information for EV Francisco Leyvapiper Tomlin    Birth information:  YOB: 2023   Time of birth: 12:36 PM   Sex: male   Head Delivery Date/Time: 2025 12:36 PM   Delivery type: , Low Transverse   Gestational Age: 35w6d       Delivery Providers    Delivering clinician: Jermaine Sosa DO   Provider Role    Nunu Chandra CNM               Measurements    Weight: 2150 g  Weight (lbs): 4 lb 11.8 oz  Length: 44.5 cm  Length (in): 17.5"         Apgars    Living status: Living  Apgar Component Scores:  1 min.:  5 min.:  10 min.:  15 min.:  20 min.:    Skin color:  0  1       Heart rate:  2  2       Reflex irritability:  2  2       Muscle tone:  2  2       Respiratory effort:  2  2       Total:  8  9       Apgars assigned by: ROBERTO ROOT         Operative Delivery    Forceps attempted?: No  Vacuum extractor attempted?: No         Shoulder Dystocia    Shoulder dystocia present?: " No           Presentation    Presentation: Vertex  Position: Middle Occiput Anterior           Interventions/Resuscitation    Method: Bulb Suctioning, Tactile Stimulation, NICU Attended, Blow By Oxygen       Cord    Vessels: 3 vessels  Complications: None  Delayed Cord Clamping?: Yes  Cord Blood Disposition: Lab  Gases Sent?: Yes  Stem Cell Collection (by MD): No       Placenta    Placenta delivery date/time: 2025 12:38  Placenta removal: Manual removal  Placenta appearance: Intact  Placenta disposition: Donation           Labor Events:       labor: No     Labor Onset Date/Time:         Dilation Complete Date/Time:         Start Pushing Date/Time:       Rupture Date/Time:            Rupture type:          Fluid Amount:       Fluid Color:        steroids: Full Course     Antibiotics given for GBS: No     Induction:       Indications for induction:        Augmentation:       Indications for augmentation:       Labor complications: None     Additional complications:          Cervical ripening:                     Delivery:      Episiotomy:       Indication for Episiotomy:       Perineal Lacerations:   Repaired:      Periurethral Laceration:   Repaired:     Labial Laceration:   Repaired:     Sulcus Laceration:   Repaired:     Vaginal Laceration:   Repaired:     Cervical Laceration:   Repaired:     Repair suture:       Repair # of packets:       Last Value - EBL - Nursing (mL):       Sum - EBL - Nursing (mL): 0     Last Value - EBL - Anesthesia (mL):      Calculated QBL (mL): 613     Running total QBL (mL): 613     Vaginal Sweep Performed:       Surgicount Correct:         Other providers:       Anesthesia    Method: General          Details (if applicable):  Trial of Labor No    Categorization: Repeat    Priority: Routine   Indications for : Prior Uterine Surgery;Multiple Gestation;Repeat Section   Incision Type: low transverse     Additional   information:  Forceps:    Vacuum:    Breech:    Observed anomalies    Other (Comments):

## 2025-04-23 NOTE — ANESTHESIA PREPROCEDURE EVALUATION
2025  Dominique Tomlin is a 30 y.o., female.      Pre-op Assessment    I have reviewed the Patient Summary Reports.    I have reviewed the NPO Status.   I have reviewed the Medications.     Review of Systems         Anesthesia Plan  Type of Anesthesia, risks & benefits discussed:    Anesthesia Type: Spinal  Intra-op Monitoring Plan: Standard ASA Monitors  Informed Consent: Informed consent signed with the Patient and all parties understand the risks and agree with anesthesia plan.  All questions answered.   ASA Score: 2    Ready For Surgery From Anesthesia Perspective.     .  No known anesthetic complications  NKDA    Htc 29  Platelets 49     at 35 weeks  Idiopathic thrombocytopenic purpura  Anemia (2 units pRBCs transfused yesterday)    Airway exam deferred (COVID precautions)    Plan is GETA (platelets too low for SAB; also, patient requested general anesthesia)

## 2025-04-23 NOTE — H&P
[facility] -  Labor and Delivery  Obstetrics  History & Physical    Patient Name: Gloria Tomlin  MRN: [id number]  Admission Date: 2025  Primary Care Provider: Anita Celis MD    Subjective:     Principal Problem:Poor fetal growth affecting management of mother in third trimester    History of Present Illness: , Twin Gestation Mono/Di. Pt admitted on 25 with plan for RCS today.  She was seen by QASIM Heaton yesterday with poor fetal growth noted and recommendation to proceed with delivery.  Rec'd 2 units PRBCs last night.   Currently pt is awake and alert with no significant complaints or s/s distress.  She reports good fetal movements, no LOF or vaginal bleeding.     This pregnancy has been complicated by ITP, Twin Gestation Mono/Di, Polyhydramnios Twin A, Poor Fetal Growth, Anemia, Previous  x1    OB History    Para Term  AB Living   2 1 1 0 0 1   SAB IAB Ectopic Multiple Live Births   0 0 0 0 1      # Outcome Date GA Lbr Wayne/2nd Weight Sex Type Anes PTL Lv   2 Current            1 Term 10/04/23 39w4d  2.897 kg (6 lb 6.2 oz) F CS-LTranv Gen N SERA      Name: STALNI,GIRL GLORIA      Apgar1: 7  Apgar5: 8     Past Medical History:   Diagnosis Date    Acne     Idiopathic thrombocytopenic purpura (ITP)      Past Surgical History:   Procedure Laterality Date     SECTION N/A 10/4/2023    Procedure:  SECTION;  Surgeon: Jermaine Sosa DO;  Location: [facility] L&D;  Service: OB/GYN;  Laterality: N/A;       PTA Medications   Medication Sig    prenatal vit no.124/iron/folic (PRENATAL VITAMIN ORAL) Take 1 tablet by mouth once daily.    clindamycin (CLEOCIN T) 1 % Swab Apply topically 2 (two) times daily.    iron-vit c-b12-folic acid (IRON 100 PLUS) Tab Take 1 tablet by mouth Daily.    oxyCODONE-acetaminophen (PERCOCET) 5-325 mg per tablet Take 1 tablet by mouth every 4 (four) hours as needed for Pain. (Patient not taking: Reported on 2024)        Review of patient's allergies indicates:  No Known Allergies     Family History       Problem Relation (Age of Onset)    Prostate cancer Maternal Grandfather    Thrombocytopenia Mother, Sister, Maternal Grandmother          Tobacco Use    Smoking status: Never    Smokeless tobacco: Never   Substance and Sexual Activity    Alcohol use: Never    Drug use: Never    Sexual activity: Yes     Partners: Male     Birth control/protection: None     Review of Systems   Constitutional:  Negative for fever.   Eyes:  Negative for visual disturbance.   Respiratory:  Negative for shortness of breath.    Cardiovascular:  Negative for leg swelling.   Gastrointestinal:  Negative for abdominal pain, nausea and vomiting.   Genitourinary:  Negative for vaginal bleeding.   Musculoskeletal:  Negative for back pain.   Neurological:  Negative for headaches.      Objective:     Vital Signs (Most Recent):  Temp: 97.5 °F (36.4 °C) (04/23/25 0716)  Pulse: 65 (04/23/25 0716)  Resp: 17 (04/23/25 0716)  BP: (!) 117/58 (04/23/25 0716)  SpO2: 99 % (04/23/25 0716) Vital Signs (24h Range):  Temp:  [97.5 °F (36.4 °C)-98.7 °F (37.1 °C)] 97.5 °F (36.4 °C)  Pulse:  [] 65  Resp:  [16-20] 17  SpO2:  [98 %-100 %] 99 %  BP: ()/(46-69) 117/58     Weight: 78.9 kg (173 lb 14.4 oz)  Body mass index is 36.36 kg/m².    FHT: Twin A 130, Twin B 140 Cat 1 (reassuring)  TOCO:  No Ucs noted on tracing    Physical Exam:   Constitutional: She is oriented to person, place, and time. She appears well-developed and well-nourished. No distress.    HENT:   Head: Normocephalic.      Cardiovascular:  Normal rate and regular rhythm.      Exam reveals no edema.        Pulmonary/Chest: Effort normal and breath sounds normal.        Abdominal: Soft.   Gravid, non-tender             Musculoskeletal: Moves all extremeties.       Neurological: She is alert and oriented to person, place, and time. She has normal reflexes.    Skin: Skin is warm and dry.    Psychiatric:  "She has a normal mood and affect.       Cervix:  Deferred     Significant Labs:  Lab Results   Component Value Date    GROUPTRH O POS 2025    HEPBSAG Non-Reactive 2024       CBC:   Recent Labs   Lab 25  1720   WBC 8.64   RBC 3.56*   HGB 8.0*   HCT 28.6*   PLT 49*   MCV 80.3   MCH 22.5*   MCHC 28.0*     CMP: No results for input(s): "GLU", "CALCIUM", "ALBUMIN", "PROT", "NA", "K", "CO2", "CL", "BUN", "CREATININE", "ALKPHOS", "ALT", "AST", "BILITOT" in the last 48 hours.  I have personallly reviewed all pertinent lab results from the last 24 hours.    Assessment/Plan:     30 y.o. female  at 35w6d for:    Active Diagnoses:    Diagnosis Date Noted POA    PRINCIPAL PROBLEM:  Poor fetal growth affecting management of mother in third trimester [O36.5930] 2025 Yes    Monochorionic diamniotic twin gestation in third trimester [O30.033] 2025 Yes    35 weeks gestation of pregnancy [Z3A.35] 2025 Not Applicable    Anemia during pregnancy in third trimester [O99.013] 2025 Yes    Polyhydramnios in third trimester, fetus 1 [O40.3XX1] 2025 Yes    Idiopathic thrombocytopenic purpura (ITP) [D69.3] 10/04/2023 Yes      Problems Resolved During this Admission:     Proceed with RCS today  Nursery and Anesthesia aware of pt status  2 units PLT and PRBCs on hold for surgery  Dr Sosa in agreement with plan    Nunu Chandra CNM  Obstetrics  [facility] -  Labor and Delivery      "

## 2025-04-23 NOTE — PLAN OF CARE
Problem: Adult Inpatient Plan of Care  Goal: Plan of Care Review  2025 1449 by Alisa Schilling RN  Outcome: Progressing  2025 0753 by Alisa Schilling RN  Outcome: Progressing  Goal: Patient-Specific Goal (Individualized)  2025 1449 by Alisa Schilling RN  Outcome: Progressing  2025 0753 by Alisa Schilling RN  Outcome: Progressing  Goal: Absence of Hospital-Acquired Illness or Injury  2025 1449 by Alisa Schilling RN  Outcome: Progressing  2025 0753 by Alisa Schilling RN  Outcome: Progressing  Goal: Optimal Comfort and Wellbeing  2025 1449 by Alisa Schilling RN  Outcome: Progressing  2025 0753 by Alisa Schilling RN  Outcome: Progressing  Goal: Readiness for Transition of Care  2025 1449 by Alisa Schilling RN  Outcome: Progressing  2025 0753 by Alisa Schilling RN  Outcome: Progressing     Problem:  Fall Injury Risk  Goal: Absence of Fall, Infant Drop and Related Injury  2025 1449 by Alisa Schilling RN  Outcome: Progressing  2025 0753 by Alisa Schilling RN  Outcome: Progressing     Problem: Infection  Goal: Absence of Infection Signs and Symptoms  2025 1449 by Alisa Schilling RN  Outcome: Progressing  2025 0753 by Alisa Schilling RN  Outcome: Progressing     Problem: Wound  Goal: Optimal Coping  Outcome: Progressing  Goal: Optimal Functional Ability  Outcome: Progressing  Goal: Absence of Infection Signs and Symptoms  Outcome: Progressing  Goal: Improved Oral Intake  Outcome: Progressing  Goal: Optimal Pain Control and Function  Outcome: Progressing  Goal: Skin Health and Integrity  Outcome: Progressing  Goal: Optimal Wound Healing  Outcome: Progressing     Problem: Postpartum ( Delivery)  Goal: Successful Parent Role Transition  Outcome: Progressing  Goal: Hemostasis  Outcome:  Progressing  Goal: Effective Bowel Elimination  Outcome: Progressing  Goal: Fluid and Electrolyte Balance  Outcome: Progressing  Goal: Absence of Infection Signs and Symptoms  Outcome: Progressing  Goal: Anesthesia/Sedation Recovery  Outcome: Progressing  Goal: Optimal Pain Control and Function  Outcome: Progressing  Goal: Nausea and Vomiting Relief  Outcome: Progressing  Goal: Effective Urinary Elimination  Outcome: Progressing  Goal: Effective Oxygenation and Ventilation  Outcome: Progressing

## 2025-04-24 LAB
ANISOCYTOSIS BLD QL SMEAR: ABNORMAL
BASOPHILS # BLD AUTO: 0.02 K/UL (ref 0–0.2)
BASOPHILS NFR BLD AUTO: 0.1 % (ref 0–1)
DIFFERENTIAL METHOD BLD: ABNORMAL
EOSINOPHIL # BLD AUTO: 0.06 K/UL (ref 0–0.5)
EOSINOPHIL NFR BLD AUTO: 0.4 % (ref 1–4)
ERYTHROCYTE [DISTWIDTH] IN BLOOD BY AUTOMATED COUNT: 16.3 % (ref 11.5–14.5)
HCT VFR BLD AUTO: 31.1 % (ref 38–47)
HGB BLD-MCNC: 9.2 G/DL (ref 12–16)
IMM GRANULOCYTES # BLD AUTO: 0.25 K/UL (ref 0–0.04)
IMM GRANULOCYTES NFR BLD: 1.6 % (ref 0–0.4)
LYMPHOCYTES # BLD AUTO: 2.45 K/UL (ref 1–4.8)
LYMPHOCYTES NFR BLD AUTO: 16.1 % (ref 27–41)
MCH RBC QN AUTO: 23.7 PG (ref 27–31)
MCHC RBC AUTO-ENTMCNC: 29.6 G/DL (ref 32–36)
MCV RBC AUTO: 79.9 FL (ref 80–96)
MONOCYTES # BLD AUTO: 1.15 K/UL (ref 0–0.8)
MONOCYTES NFR BLD AUTO: 7.6 % (ref 2–6)
MPC BLD CALC-MCNC: ABNORMAL G/DL
NEUTROPHILS # BLD AUTO: 11.27 K/UL (ref 1.8–7.7)
NEUTROPHILS NFR BLD AUTO: 74.2 % (ref 53–65)
NRBC # BLD AUTO: 0.11 X10E3/UL
NRBC, AUTO (.00): 0.7 %
PLATELET # BLD AUTO: 47 K/UL (ref 150–400)
PLATELET MORPHOLOGY: ABNORMAL
POLYCHROMASIA BLD QL SMEAR: ABNORMAL
RBC # BLD AUTO: 3.89 M/UL (ref 4.2–5.4)
WBC # BLD AUTO: 15.2 K/UL (ref 4.5–11)

## 2025-04-24 PROCEDURE — 99900035 HC TECH TIME PER 15 MIN (STAT)

## 2025-04-24 PROCEDURE — 94761 N-INVAS EAR/PLS OXIMETRY MLT: CPT

## 2025-04-24 PROCEDURE — 85025 COMPLETE CBC W/AUTO DIFF WBC: CPT | Performed by: STUDENT IN AN ORGANIZED HEALTH CARE EDUCATION/TRAINING PROGRAM

## 2025-04-24 PROCEDURE — 36415 COLL VENOUS BLD VENIPUNCTURE: CPT | Performed by: STUDENT IN AN ORGANIZED HEALTH CARE EDUCATION/TRAINING PROGRAM

## 2025-04-24 PROCEDURE — 25000003 PHARM REV CODE 250: Performed by: ADVANCED PRACTICE MIDWIFE

## 2025-04-24 PROCEDURE — 25000003 PHARM REV CODE 250: Performed by: STUDENT IN AN ORGANIZED HEALTH CARE EDUCATION/TRAINING PROGRAM

## 2025-04-24 PROCEDURE — 11000001 HC ACUTE MED/SURG PRIVATE ROOM

## 2025-04-24 PROCEDURE — 63600175 PHARM REV CODE 636 W HCPCS: Performed by: ADVANCED PRACTICE MIDWIFE

## 2025-04-24 RX ORDER — OXYCODONE AND ACETAMINOPHEN 5; 325 MG/1; MG/1
1 TABLET ORAL EVERY 4 HOURS PRN
Qty: 20 EACH | Refills: 0 | Status: SHIPPED | OUTPATIENT
Start: 2025-04-24

## 2025-04-24 RX ORDER — FERROUS SULFATE 325(65) MG
325 TABLET, DELAYED RELEASE (ENTERIC COATED) ORAL 2 TIMES DAILY
Qty: 60 TABLET | Refills: 0 | Status: SHIPPED | OUTPATIENT
Start: 2025-04-24

## 2025-04-24 RX ORDER — AMOXICILLIN 250 MG
1 CAPSULE ORAL DAILY
Qty: 14 TABLET | Refills: 0 | Status: SHIPPED | OUTPATIENT
Start: 2025-04-25

## 2025-04-24 RX ORDER — LANOLIN ALCOHOL/MO/W.PET/CERES
1 CREAM (GRAM) TOPICAL 2 TIMES DAILY
Status: DISCONTINUED | OUTPATIENT
Start: 2025-04-24 | End: 2025-04-25 | Stop reason: HOSPADM

## 2025-04-24 RX ORDER — AMOXICILLIN 250 MG
1 CAPSULE ORAL DAILY
Status: DISCONTINUED | OUTPATIENT
Start: 2025-04-24 | End: 2025-04-25 | Stop reason: HOSPADM

## 2025-04-24 RX ORDER — DOCUSATE SODIUM 100 MG/1
200 CAPSULE, LIQUID FILLED ORAL 2 TIMES DAILY PRN
Status: DISCONTINUED | OUTPATIENT
Start: 2025-04-24 | End: 2025-04-25 | Stop reason: HOSPADM

## 2025-04-24 RX ADMIN — FERROUS SULFATE TAB EC 325 MG (65 MG FE EQUIVALENT) 1 EACH: 325 (65 FE) TABLET DELAYED RESPONSE at 08:04

## 2025-04-24 RX ADMIN — OXYCODONE AND ACETAMINOPHEN 1 TABLET: 10; 325 TABLET ORAL at 08:04

## 2025-04-24 RX ADMIN — OXYCODONE HYDROCHLORIDE AND ACETAMINOPHEN 1 TABLET: 5; 325 TABLET ORAL at 06:04

## 2025-04-24 RX ADMIN — FERROUS SULFATE TAB EC 325 MG (65 MG FE EQUIVALENT) 1 EACH: 325 (65 FE) TABLET DELAYED RESPONSE at 10:04

## 2025-04-24 RX ADMIN — DOCUSATE SODIUM 200 MG: 100 CAPSULE, LIQUID FILLED ORAL at 07:04

## 2025-04-24 RX ADMIN — SENNOSIDES AND DOCUSATE SODIUM 1 TABLET: 50; 8.6 TABLET ORAL at 08:04

## 2025-04-24 RX ADMIN — CEFAZOLIN 1 G: 330 INJECTION, POWDER, FOR SOLUTION INTRAMUSCULAR; INTRAVENOUS at 04:04

## 2025-04-24 RX ADMIN — SIMETHICONE 80 MG: 80 TABLET, CHEWABLE ORAL at 07:04

## 2025-04-24 RX ADMIN — DIPHENHYDRAMINE HYDROCHLORIDE 25 MG: 25 CAPSULE ORAL at 08:04

## 2025-04-24 RX ADMIN — MUPIROCIN: 20 OINTMENT TOPICAL at 08:04

## 2025-04-24 RX ADMIN — Medication 1 TABLET: at 08:04

## 2025-04-24 NOTE — PROGRESS NOTES
[facility] -  Labor and Delivery  Obstetrics  Postpartum Progress Note    Patient Name: Dominique Tomlin  MRN: [id number]  Admission Date: 4/22/2025  Hospital Length of Stay: 2 days  Attending Physician: Jermaine Sosa DO  Primary Care Provider: Anita Celis MD    Subjective:     Principal Problem:Poor fetal growth affecting management of mother in third trimester    Hospital course: RCS 4/23/25, twin gestation at 35w6d    Interval History: Postop Day 1    She is doing well this morning. She is tolerating a regular diet without nausea or vomiting. She is not voiding spontaneously. She is ambulating. She has not passed flatus, and has not a BM. Vaginal bleeding is mild. She denies fever or chills. Abdominal pain is mild and controlled with oral medications. She Is breastfeeding (pumping). She desires circumcision for her male baby: yes.     Objective:     Vital Signs (Most Recent):  Temp: 97.8 °F (36.6 °C) (04/24/25 0408)  Pulse: 65 (04/24/25 0408)  Resp: 17 (04/24/25 0408)  BP: (!) 91/50 (04/24/25 0408)  SpO2: 99 % (04/24/25 0408) Vital Signs (24h Range):  Temp:  [97.8 °F (36.6 °C)-98.4 °F (36.9 °C)] 97.8 °F (36.6 °C)  Pulse:  [44-77] 65  Resp:  [15-20] 17  SpO2:  [9 %-99 %] 99 %  BP: ()/(45-83) 91/50     Weight: 78.9 kg (173 lb 14.4 oz)  Body mass index is 36.36 kg/m².      Intake/Output Summary (Last 24 hours) at 4/24/2025 0816  Last data filed at 4/23/2025 2300  Gross per 24 hour   Intake --   Output 2763 ml   Net -2763 ml       Physical Exam:   Constitutional: She is oriented to person, place, and time. She appears well-developed.    HENT:   Head: Normocephalic.      Cardiovascular:  Normal rate and regular rhythm.             Pulmonary/Chest: Effort normal and breath sounds normal.        Abdominal: Soft. Bowel sounds are normal. She exhibits abdominal incision (well-approximated, no s/s infection). She exhibits no distension. There is no abdominal tenderness.              Musculoskeletal: Moves all extremeties. No edema.       Neurological: She is alert and oriented to person, place, and time.    Skin: Skin is warm and dry.    Psychiatric: She has a normal mood and affect.       Significant Labs:  Lab Results   Component Value Date    GROUPTRH O POS 2025    HEPBSAG Non-Reactive 2024     Recent Labs   Lab 25  0340   HGB 9.2*   HCT 31.1*       CBC:   Recent Labs   Lab 25  0340   WBC 15.20*   RBC 3.89*   HGB 9.2*   HCT 31.1*   PLT 47*   MCV 79.9*   MCH 23.7*   MCHC 29.6*     I have personallly reviewed all pertinent lab results from the last 24 hours.    Assessment/Plan:     30 y.o. female  at 35w6d for:    Active Diagnoses:    Diagnosis Date Noted POA    PRINCIPAL PROBLEM:  Poor fetal growth affecting management of mother in third trimester [O36.5930] 2025 Yes    Monochorionic diamniotic twin gestation in third trimester [O30.033] 2025 Yes    35 weeks gestation of pregnancy [Z3A.35] 2025 Not Applicable    Anemia during pregnancy in third trimester [O99.013] 2025 Yes    Polyhydramnios in third trimester, fetus 1 [O40.3XX1] 2025 Yes    Idiopathic thrombocytopenic purpura (ITP) [D69.3] 10/04/2023 Yes      Problems Resolved During this Admission:       Routine postpartum care    Disposition: As patient meets milestones, will plan to discharge tomorrow or Saturday.    Nunu Chandra CNM  Obstetrics  [facility] -  Labor and Delivery

## 2025-04-24 NOTE — DISCHARGE SUMMARY
"[facility] -  Labor and Delivery  Obstetrics  Discharge Summary      Patient Name: Dominique Tomlin  MRN: [id number]  Admission Date: 2025  Hospital Length of Stay: 2 days  Discharge Date and Time:  25  Attending Physician: Jermaine Sosa DO   Discharging Provider: Nunu Mahmood CNM  Primary Care Provider: Anita Celis MD    HPI: Pt is awake and alert with no s/s distress.  Tolerating ambulation and regular diet.  Pain is well controlled with prn meds.  Voiding and passing gas.  Abd soft, non-tender. Incision well approximated with no s/s infection. Small amount lochia rubra. No edema lower extrem.      Procedure(s) (LRB):   SECTION (N/A)     Hospital Course: RCS 25 per Octaviano Jerome Assist.  35w6d twin mono/di with IUGR, Polyhydramnios, ITP, Anemia.  Postpartum course has been uncomplicated. Infants are doing well, currently in NICU.     Consults (From admission, onward)          Status Ordering Provider     Inpatient consult to Anesthesiology  Once        Provider:  (Not yet assigned)    Acknowledged NUNU MAHMOOD            Final Active Diagnoses:    Diagnosis Date Noted POA    PRINCIPAL PROBLEM:  Delivery of pregnancy by  section [O82] 10/05/2023 Yes    Monochorionic diamniotic twin gestation in third trimester [O30.033] 2025 Yes    35 weeks gestation of pregnancy [Z3A.35] 2025 Not Applicable    Anemia during pregnancy in third trimester [O99.013] 2025 Yes    Poor fetal growth affecting management of mother in third trimester [O36.5930] 2025 Yes    Polyhydramnios in third trimester, fetus 1 [O40.3XX1] 2025 Yes    Idiopathic thrombocytopenic purpura (ITP) [D69.3] 10/04/2023 Yes      Problems Resolved During this Admission:        Labs: CMP No results for input(s): "NA", "K", "CL", "CO2", "GLU", "BUN", "CREATININE", "CALCIUM", "PROT", "ALBUMIN", "BILITOT", "ALKPHOS", "AST", "ALT", "ANIONGAP", "ESTGFRAFRICA", "EGFRNONAA" in " "the last 48 hours. and CBC   Recent Labs   Lab 25  1720 25  0721 25  0340   WBC 8.64 9.69 15.20*   HGB 8.0* 9.2* 9.2*   HCT 28.6* 30.4* 31.1*   PLT 49* 45* 47*       Feeding Method: both breast and bottle    Immunizations       Date Immunization Status Dose Route/Site Given by    25 1238 MMR Incomplete 0.5 mL Subcutaneous/     25 1238 Tdap Incomplete 0.5 mL Intramuscular/                Francisco Tomlin [[id number]]     Delivery:    Episiotomy:     Lacerations:     Repair suture:     Repair # of packets:     Blood loss (ml):       Birth information:  YOB: 2023   Time of birth: 12:34 PM   Sex: male   Delivery type: , Low Transverse   Gestational Age: 35w6d     Measurements    Weight: 1920 g  Weight (lbs): 4 lb 3.7 oz  Length: 40.6 cm  Length (in): 16"  Head circumference: 32 cm  Chest circumference: 27 cm  Abdominal girth: 27 cm         Delivery Clinician: Delivery Providers    Delivering clinician: Jermaine Sosa DO   Provider Role    Nunu Chandra CNM              Additional  information:  Forceps:    Vacuum:    Breech:    Observed anomalies      Living?:     Apgars    Living status: Living  Apgar Component Scores:  1 min.:  5 min.:  10 min.:  15 min.:  20 min.:    Skin color:  0  1       Heart rate:  2  2       Reflex irritability:  2  2       Muscle tone:  2  2       Respiratory effort:  2  2       Total:  8  9       Apgars assigned by: ROBERTO ROOT         Placenta: Delivered:       appearance     EV Tomlin [[id number]]     Delivery:    Episiotomy:     Lacerations:     Repair suture:     Repair # of packets:     Blood loss (ml):       Birth information:  YOB: 2023   Time of birth: 12:36 PM   Sex: male   Delivery type: , Low Transverse   Gestational Age: 35w6d     Measurements    Weight: 2150 g  Weight (lbs): 4 lb 11.8 oz  Length: 44.5 cm  Length (in): 17.5"         Delivery Clinician: Delivery Providers  "   Delivering clinician: Jermaine Sosa DO   Provider Role    Nunu Chandra CNM              Additional  information:  Forceps:    Vacuum:    Breech:    Observed anomalies      Living?:     Apgars    Living status: Living  Apgar Component Scores:  1 min.:  5 min.:  10 min.:  15 min.:  20 min.:    Skin color:  0  1       Heart rate:  2  2       Reflex irritability:  2  2       Muscle tone:  2  2       Respiratory effort:  2  2       Total:  8  9       Apgars assigned by: ROBERTO ROOT         Placenta: Delivered:       appearance  Pending Diagnostic Studies:       None            Discharged Condition: good    Disposition: Home or Self Care    Follow Up:   Follow-up Information       Nunu Chandra CNM Follow up in 1 week(s).    Specialty: Obstetrics and Gynecology  Why: Incision Check  Contact information:  [address]  [facility] [address]  [phone]                           Patient Instructions:   No discharge procedures on file.  Medications:  Current Discharge Medication List        START taking these medications    Details   ferrous sulfate 325 (65 FE) MG EC tablet Take 1 tablet (325 mg total) by mouth 2 (two) times daily.  Qty: 60 tablet, Refills: 0      oxyCODONE-acetaminophen (PERCOCET) 5-325 mg per tablet Take 1 tablet by mouth every 4 (four) hours as needed for Pain.  Qty: 20 each, Refills: 0    Comments: n/a   Associated Diagnoses: Delivery of pregnancy by  section      senna-docusate (PERICOLACE) 8.6-50 mg per tablet Take 1 tablet by mouth once daily.  Qty: 14 tablet, Refills: 0           CONTINUE these medications which have NOT CHANGED    Details   prenatal vit no.124/iron/folic (PRENATAL VITAMIN ORAL) Take 1 tablet by mouth once daily.           STOP taking these medications       clindamycin (CLEOCIN T) 1 % Swab Comments:   Reason for Stopping:         iron-vit c-b12-folic acid (IRON 100 PLUS) Tab Comments:   Reason for Stopping:               Nunu Chandra CNM  Obstetrics  [facility] -  Labor and Delivery

## 2025-04-24 NOTE — LACTATION NOTE
This note was copied from a baby's chart.  [facility] - NICU  Lactation Note    Infant's Name: Refugio Tomlin          MRN: [id number]   : 25   Birth Weight: 4.2150  GA: 35.6         Mother's Name: Dominique Tomlin          MRN: [id number]  Phone: [phone]  Age: 28    G 2 P 3        OB: [place]      Method of Delivery: c/s  Previous BF Experience: exclusively pumped for last baby            Mother's Breastfeeding Goal:    Pump Set Up: 1720  Home use pump: spectra    25:  Note: pump set up at moms bedside, instructed on use, labeling and cleaning. Mom has spectra pump for home use, mom pumping at present    Education:   Labeling breast milk  Cleaning breast pump  Breast pump use  Pumping frequency and duration  Hand hygiene

## 2025-04-24 NOTE — PLAN OF CARE
Problem: Adult Inpatient Plan of Care  Goal: Plan of Care Review  2025 by Beto Pate RN  Outcome: Progressing  2025 by Beto Pate RN  Outcome: Progressing  Goal: Patient-Specific Goal (Individualized)  2025 by Beto Pate RN  Outcome: Progressing  2025 by Beto Pate RN  Outcome: Progressing  Goal: Absence of Hospital-Acquired Illness or Injury  2025 by Beto Pate RN  Outcome: Progressing  2025 by Beto Pate RN  Outcome: Progressing  Goal: Optimal Comfort and Wellbeing  2025 by Beto Pate RN  Outcome: Progressing  2025 by Beto Pate RN  Outcome: Progressing  Goal: Readiness for Transition of Care  2025 by Beto Pate RN  Outcome: Progressing  2025 by Beto Pate RN  Outcome: Progressing     Problem:  Fall Injury Risk  Goal: Absence of Fall, Infant Drop and Related Injury  2025 by Beto Pate RN  Outcome: Progressing  2025 by Beto Pate RN  Outcome: Progressing     Problem: Infection  Goal: Absence of Infection Signs and Symptoms  2025 by Beto Pate RN  Outcome: Progressing  2025 by Beto Pate RN  Outcome: Progressing     Problem: Wound  Goal: Optimal Coping  2025 by Beto Pate RN  Outcome: Progressing  2025 by Beto Pate RN  Outcome: Progressing  Goal: Optimal Functional Ability  2025 by Beto Pate RN  Outcome: Progressing  2025 by Beto Pate RN  Outcome: Progressing  Goal: Absence of Infection Signs and Symptoms  2025 by Beto Pate RN  Outcome: Progressing  2025 by Gallaspy, Malori C, RN  Outcome: Progressing  Goal: Improved Oral Intake  2025 by Beto Pate, RN  Outcome: Progressing  2025 by Beto Pate,  RN  Outcome: Progressing  Goal: Optimal Pain Control and Function  2025 by Beto Pate RN  Outcome: Progressing  2025 by Beto Pate RN  Outcome: Progressing  Goal: Skin Health and Integrity  2025 by Beto Pate RN  Outcome: Progressing  2025 by Beto Pate, RN  Outcome: Progressing  Goal: Optimal Wound Healing  2025 by Beto Pate RN  Outcome: Progressing  2025 by Beto Pate RN  Outcome: Progressing     Problem: Postpartum ( Delivery)  Goal: Successful Parent Role Transition  2025 by Beto Pate RN  Outcome: Progressing  2025 by Beto Pate RN  Outcome: Progressing  Goal: Hemostasis  2025 by Beto Pate, RN  Outcome: Progressing  2025 by Beto Pate RN  Outcome: Progressing  Goal: Effective Bowel Elimination  2025 by Beto Pate RN  Outcome: Progressing  2025 by Beto Pate RN  Outcome: Progressing  Goal: Fluid and Electrolyte Balance  2025 by Beto Pate, RN  Outcome: Progressing  2025 by Beto Pate, RN  Outcome: Progressing  Goal: Absence of Infection Signs and Symptoms  2025 by Beto Pate, RN  Outcome: Progressing  2025 by Beto Pate RN  Outcome: Progressing  Goal: Anesthesia/Sedation Recovery  2025 by Beto Pate RN  Outcome: Progressing  2025 by Beto Pate RN  Outcome: Progressing  Goal: Optimal Pain Control and Function  2025 by Beto Pate RN  Outcome: Progressing  2025 by Beto Pate RN  Outcome: Progressing  Goal: Nausea and Vomiting Relief  2025 by Beto Pate, RN  Outcome: Progressing  2025 by Beto Pate, RN  Outcome: Progressing  Goal: Effective Urinary Elimination  2025 by Beto Pate,  RN  Outcome: Progressing  4/23/2025 2021 by Beto Pate RN  Outcome: Progressing  Goal: Effective Oxygenation and Ventilation  4/23/2025 2021 by Beto Pate RN  Outcome: Progressing  4/23/2025 2021 by Beto Pate RN  Outcome: Progressing

## 2025-04-24 NOTE — LACTATION NOTE
This note was copied from a baby's chart.  nfant's Name: Refugio Tomlin                                                                                                MRN: [id number]          : 25    Birth Weight: 1920/4.4  GA: 35.6                                                             Mother's Name: Dominique Tomlin                                                                                  MRN: [id number]  Phone: [phone]  Age: 30                         G 2 P 3                                                                          OB: [place]                                          Method of Delivery: c/s  Previous BF Experience: exclusively pumped for last baby                                                                                                                         Mother's Breastfeeding Goal:     Pump Set Up: 1720  Home use pump: spectra     25:  Note: pump set up at moms bedside, instructed on use, labeling and cleaning. Mom has spectra pump for home use, mom pumping at present     Education:   Labeling breast milk  Cleaning breast pump  Breast pump use  Pumping frequency and duration  Hand hygiene

## 2025-04-25 VITALS
WEIGHT: 173.88 LBS | DIASTOLIC BLOOD PRESSURE: 61 MMHG | HEIGHT: 58 IN | SYSTOLIC BLOOD PRESSURE: 100 MMHG | OXYGEN SATURATION: 96 % | RESPIRATION RATE: 18 BRPM | TEMPERATURE: 98 F | BODY MASS INDEX: 36.5 KG/M2 | HEART RATE: 82 BPM

## 2025-04-25 LAB
ABO + RH BLD: NORMAL
BLD PROD TYP BPU: NORMAL
BLOOD UNIT EXPIRATION DATE: NORMAL
BLOOD UNIT TYPE CODE: 5100
BLOOD UNIT TYPE CODE: 5100
BLOOD UNIT TYPE CODE: 8400
CROSSMATCH INTERPRETATION: NORMAL
DISPENSE STATUS: NORMAL
UNIT NUMBER: NORMAL

## 2025-04-25 PROCEDURE — 99900035 HC TECH TIME PER 15 MIN (STAT)

## 2025-04-25 PROCEDURE — 25000003 PHARM REV CODE 250: Performed by: ADVANCED PRACTICE MIDWIFE

## 2025-04-25 PROCEDURE — 25000003 PHARM REV CODE 250: Performed by: STUDENT IN AN ORGANIZED HEALTH CARE EDUCATION/TRAINING PROGRAM

## 2025-04-25 RX ADMIN — Medication 1 TABLET: at 09:04

## 2025-04-25 RX ADMIN — FERROUS SULFATE TAB EC 325 MG (65 MG FE EQUIVALENT) 1 EACH: 325 (65 FE) TABLET DELAYED RESPONSE at 09:04

## 2025-04-25 RX ADMIN — SENNOSIDES AND DOCUSATE SODIUM 1 TABLET: 50; 8.6 TABLET ORAL at 09:04

## 2025-04-25 RX ADMIN — MUPIROCIN: 20 OINTMENT TOPICAL at 09:04

## 2025-04-25 NOTE — LACTATION NOTE
This note was copied from a baby's chart.  [facility] - NICU  Lactation Note    Infant's Name: Refugio Tomlin          MRN: [id number]   : 25   Birth Weight: 4.2150  GA: 35.6         Mother's Name: Dominique Tomlin          MRN: [id number]  Phone: [phone]  Age: 28    G 2 P 3        OB: [place]      Method of Delivery: c/s  Previous BF Experience: exclusively pumped for last baby            Mother's Breastfeeding Goal:    Pump Set Up: 1720  Home use pump: spectra    25:  Note: pump set up at moms bedside, instructed on use, labeling and cleaning. Mom has spectra pump for home use, mom pumping at present    Education:   Labeling breast milk  Cleaning breast pump  Breast pump use  Pumping frequency and duration  Hand hygiene    25:  Mom pumping, has 10-15 ml in colostrum container now, mom has pump for home use, denies any needs at this time

## 2025-04-25 NOTE — LACTATION NOTE
This note was copied from a baby's chart.  nfant's Name: Refugio Tomlin                                                                                                MRN: [id number]          : 25    Birth Weight: 1920/4.4  GA: 35.6                                                             Mother's Name: Dominique Tomlin                                                                                  MRN: [id number]  Phone: [phone]  Age: 30                         G 2 P 3                                                                          OB: [place]                                          Method of Delivery: c/s  Previous BF Experience: exclusively pumped for last baby                                                                                                                         Mother's Breastfeeding Goal:     Pump Set Up: 1720  Home use pump: spectra     25:  Note: pump set up at moms bedside, instructed on use, labeling and cleaning. Mom has spectra pump for home use, mom pumping at present     Education:   Labeling breast milk  Cleaning breast pump  Breast pump use  Pumping frequency and duration  Hand hygiene         25:  Mom pumping, has 10-15 ml in colostrum container now, mom has pump for home use, denies any needs at this time

## 2025-04-25 NOTE — PLAN OF CARE
Problem: Adult Inpatient Plan of Care  Goal: Plan of Care Review  Outcome: Progressing  Goal: Patient-Specific Goal (Individualized)  Outcome: Progressing  Goal: Absence of Hospital-Acquired Illness or Injury  Outcome: Progressing  Goal: Optimal Comfort and Wellbeing  Outcome: Progressing  Goal: Readiness for Transition of Care  Outcome: Progressing     Problem:  Fall Injury Risk  Goal: Absence of Fall, Infant Drop and Related Injury  Outcome: Progressing     Problem: Infection  Goal: Absence of Infection Signs and Symptoms  Outcome: Progressing     Problem: Wound  Goal: Optimal Coping  Outcome: Progressing  Goal: Optimal Functional Ability  Outcome: Progressing  Goal: Absence of Infection Signs and Symptoms  Outcome: Progressing  Goal: Improved Oral Intake  Outcome: Progressing  Goal: Optimal Pain Control and Function  Outcome: Progressing  Goal: Skin Health and Integrity  Outcome: Progressing  Goal: Optimal Wound Healing  Outcome: Progressing     Problem: Postpartum ( Delivery)  Goal: Successful Parent Role Transition  Outcome: Progressing  Goal: Hemostasis  Outcome: Progressing  Goal: Effective Bowel Elimination  Outcome: Progressing  Goal: Fluid and Electrolyte Balance  Outcome: Progressing  Goal: Absence of Infection Signs and Symptoms  Outcome: Progressing  Goal: Anesthesia/Sedation Recovery  Outcome: Progressing  Goal: Optimal Pain Control and Function  Outcome: Progressing  Goal: Nausea and Vomiting Relief  Outcome: Progressing  Goal: Effective Urinary Elimination  Outcome: Progressing  Goal: Effective Oxygenation and Ventilation  Outcome: Progressing   Preparing for discharge

## 2025-04-28 ENCOUNTER — LACTATION ENCOUNTER (OUTPATIENT)
Dept: INTENSIVE CARE | Facility: HOSPITAL | Age: 30
End: 2025-04-28

## 2025-04-28 NOTE — LACTATION NOTE
This note was copied from a baby's chart.  [facility] - NICU  Lactation Note    Infant's Name: Refugio Tomlin          MRN: [id number]   : 25   Birth Weight: .2150  GA: 35.6         Mother's Name: Dominique Tomlin          MRN: [id number]  Phone: [phone]  Age: 28    G 2 P 3        OB: [place]      Method of Delivery: c/s  Previous BF Experience: exclusively pumped for last baby            Mother's Breastfeeding Goal:    Pump Set Up: 1720  Home use pump: spectra    25:  Note: pump set up at moms bedside, instructed on use, labeling and cleaning. Mom has spectra pump for home use, mom pumping at present    Education:   Labeling breast milk  Cleaning breast pump  Breast pump use  Pumping frequency and duration  Hand hygiene    25:  Mom pumping, has 10-15 ml in colostrum container now, mom has pump for home use, denies any needs at this time    25  Mom reports pumping is going well, mom pumping with mom cozy, mom denies questions or concerns

## 2025-04-28 NOTE — ANESTHESIA POSTPROCEDURE EVALUATION
Anesthesia Post Evaluation    Patient: Dominique Tomlin    Procedure(s) Performed: Procedure(s) (LRB):   SECTION (N/A)    Final Anesthesia Type: general      Patient location during evaluation: PACU  Patient participation: Yes- Able to Participate  Level of consciousness: awake and alert  Post-procedure vital signs: reviewed and stable  Pain management: adequate  Airway patency: patent  NACHO mitigation strategies: Multimodal analgesia  PONV status at discharge: No PONV  Anesthetic complications: no      Cardiovascular status: blood pressure returned to baseline  Respiratory status: unassisted  Hydration status: euvolemic  Follow-up not needed.              Vitals Value Taken Time   /61 25 08:15   Temp 36.8 °C (98.3 °F) 25 08:15   Pulse 82 25 08:15   Resp 18 25 19:51   SpO2 96 % 25 08:15         Event Time   Out of Recovery 2025 15:23:00         Pain/Ginette Score: No data recorded

## 2025-04-28 NOTE — LACTATION NOTE
This note was copied from a baby's chart.  nfant's Name: Refugio Tomlin                                                                                                MRN: [id number]          : 25    Birth Weight: 1920/4.4  GA: 35.6                                                             Mother's Name: Dominique Tomlin                                                                                  MRN: [id number]  Phone: [phone]  Age: 30                         G 2 P 3                                                                          OB: [place]                                          Method of Delivery: c/s  Previous BF Experience: exclusively pumped for last baby                                                                                                                         Mother's Breastfeeding Goal:     Pump Set Up: 1720  Home use pump: spectra     25:  Note: pump set up at moms bedside, instructed on use, labeling and cleaning. Mom has spectra pump for home use, mom pumping at present     Education:   Labeling breast milk  Cleaning breast pump  Breast pump use  Pumping frequency and duration  Hand hygiene         25:  Mom pumping, has 10-15 ml in colostrum container now, mom has pump for home use, denies any needs at this time    25  Mom reports pumping is going well, mom pumping with mom cozy, mom denies questions or concerns

## 2025-04-28 NOTE — PLAN OF CARE
[facility] -  Labor and Delivery  Discharge Final Note    Primary Care Provider: Anita Celis MD    Expected Discharge Date: 4/25/2025    Final Discharge Note (most recent)       Final Note - 04/28/25 0919          Final Note    Assessment Type Final Discharge Note     Anticipated Discharge Disposition Home or Self Care                              Contact Info       Nunu Chandra CNM   Specialty: Obstetrics and Gynecology    [address] [facility] [address]   Phone: [phone]       Next Steps: Follow up in 1 week(s)    Instructions: Incision Check          Dc home. Faxed all dc info to [facility].

## 2025-04-29 ENCOUNTER — POSTPARTUM VISIT (OUTPATIENT)
Dept: OBSTETRICS AND GYNECOLOGY | Facility: CLINIC | Age: 30
End: 2025-04-29
Payer: COMMERCIAL

## 2025-04-29 VITALS
SYSTOLIC BLOOD PRESSURE: 110 MMHG | RESPIRATION RATE: 19 BRPM | BODY MASS INDEX: 31.5 KG/M2 | HEIGHT: 57 IN | DIASTOLIC BLOOD PRESSURE: 70 MMHG | WEIGHT: 146 LBS | HEART RATE: 98 BPM | OXYGEN SATURATION: 99 %

## 2025-04-29 DIAGNOSIS — Z98.891 STATUS POST CESAREAN SECTION: ICD-10-CM

## 2025-04-29 DIAGNOSIS — D69.3 IDIOPATHIC THROMBOCYTOPENIC PURPURA (ITP): ICD-10-CM

## 2025-04-29 DIAGNOSIS — D64.9 ANEMIA, UNSPECIFIED TYPE: ICD-10-CM

## 2025-04-29 DIAGNOSIS — Z09 POSTOP CHECK: Primary | ICD-10-CM

## 2025-04-29 DIAGNOSIS — Z30.09 GENERAL COUNSELING AND ADVICE ON CONTRACEPTIVE MANAGEMENT: ICD-10-CM

## 2025-04-29 PROCEDURE — 0503F POSTPARTUM CARE VISIT: CPT | Mod: S$GLB,,, | Performed by: ADVANCED PRACTICE MIDWIFE

## 2025-04-29 PROCEDURE — 99999 PR PBB SHADOW E&M-EST. PATIENT-LVL IV: CPT | Mod: PBBFAC,,, | Performed by: ADVANCED PRACTICE MIDWIFE

## 2025-04-29 NOTE — PROGRESS NOTES
"Postop Note    Subjective:       Dominique Tomlin is a 30 y.o. female who presents to the clinic 1 weeks status post  for  repeat at 35w, twin gestation mono/di, polyhdramnios . Eating a regular diet with difficulty. Voiding without difficulty. Bowel movements are normal. The patient is not having any pain.  Infants are doing well in NICU with plans for discharge in approx 3-4 days, pumping.  Denies depression or anxiety.     The following portions of the patient's history were reviewed and updated as appropriate: allergies, current medications, past family history, past medical history, past social history, past surgical history, and problem list.    Review of Systems  Pertinent items are noted in HPI.      OB History    Para Term  AB Living   2 2 1 1 0 3   SAB IAB Ectopic Multiple Live Births   0 0 0 1 3      # Outcome Date GA Lbr Wayne/2nd Weight Sex Type Anes PTL Lv   2A  25 35w6d  1.92 kg (4 lb 3.7 oz) M CS-LTranv Gen N SERA      Name: Francisco Tomlin      Apgar1: 8  Apgar5: 9   2B  25 35w6d  2.15 kg (4 lb 11.8 oz) M CS-LTranv Gen N SERA      Name: B Francisco Tomlin      Apgar1: 8  Apgar5: 9   1 Term 10/04/23 39w4d  2.897 kg (6 lb 6.2 oz) F CS-LTranv Gen N SERA      Name: STALIN,RASHAUN CASTRO      Apgar1: 7  Apgar5: 8     Past Medical History:   Diagnosis Date    Acne     Idiopathic thrombocytopenic purpura (ITP)      Past Surgical History:   Procedure Laterality Date     SECTION N/A 10/4/2023    Procedure:  SECTION;  Surgeon: Jermaine Sosa DO;  Location: [facility] L&D;  Service: OB/GYN;  Laterality: N/A;     SECTION N/A 2025    Procedure:  SECTION;  Surgeon: Jermaine Sosa DO;  Location: [facility] L&D;  Service: OB/GYN;  Laterality: N/A;     Objective:      /70 (BP Location: Right arm, Patient Position: Sitting)   Pulse 98   Resp 19   Ht 4' 9" (1.448 m)   Wt 66.2 kg (146 lb)   LMP 2024 " Comment: irregular  SpO2 99%   Breastfeeding Yes   BMI 31.59 kg/m²   General:  alert, appears stated age, cooperative, and no distress   Abdomen: soft, bowel sounds active, non-tender   Incision:   healing well, no drainage, no erythema, no hernia, no seroma, no swelling, no dehiscence, incision well approximated     Extremities: WNL    Assessment:     1. Postop check        2. Status post  section        3. Lactating mother        4. General counseling and advice on contraceptive management        5. Idiopathic thrombocytopenic purpura (ITP)  CBC Auto Differential      6. Anemia, unspecified type  CBC Auto Differential        Plan:      1. Continue any current medications.  2. Wound care discussed.  3. Activity restrictions: no lifting more than 10 pounds  4. Anticipated return to work:  6 weeks .  5. Follow up: 5 weeks for 6 week postpartum visit.   Plans for natural family planning

## 2025-05-14 ENCOUNTER — PATIENT MESSAGE (OUTPATIENT)
Dept: OBSTETRICS AND GYNECOLOGY | Facility: CLINIC | Age: 30
End: 2025-05-14
Payer: COMMERCIAL

## 2025-06-04 ENCOUNTER — PATIENT MESSAGE (OUTPATIENT)
Dept: OBSTETRICS AND GYNECOLOGY | Facility: CLINIC | Age: 30
End: 2025-06-04
Payer: COMMERCIAL

## 2025-06-04 ENCOUNTER — POSTPARTUM VISIT (OUTPATIENT)
Dept: OBSTETRICS AND GYNECOLOGY | Facility: CLINIC | Age: 30
End: 2025-06-04
Payer: COMMERCIAL

## 2025-06-04 VITALS
RESPIRATION RATE: 19 BRPM | OXYGEN SATURATION: 100 % | WEIGHT: 132 LBS | BODY MASS INDEX: 28.48 KG/M2 | HEART RATE: 98 BPM | DIASTOLIC BLOOD PRESSURE: 70 MMHG | HEIGHT: 57 IN | SYSTOLIC BLOOD PRESSURE: 110 MMHG

## 2025-06-04 DIAGNOSIS — Z98.891 STATUS POST CESAREAN SECTION: ICD-10-CM

## 2025-06-04 DIAGNOSIS — D69.3 IDIOPATHIC THROMBOCYTOPENIC PURPURA (ITP): ICD-10-CM

## 2025-06-04 DIAGNOSIS — D64.9 ANEMIA, UNSPECIFIED TYPE: ICD-10-CM

## 2025-06-04 PROCEDURE — 0503F POSTPARTUM CARE VISIT: CPT | Mod: S$GLB,,, | Performed by: ADVANCED PRACTICE MIDWIFE

## 2025-06-04 PROCEDURE — 99999 PR PBB SHADOW E&M-EST. PATIENT-LVL IV: CPT | Mod: PBBFAC,,, | Performed by: ADVANCED PRACTICE MIDWIFE

## 2025-06-17 ENCOUNTER — OFFICE VISIT (OUTPATIENT)
Dept: OBSTETRICS AND GYNECOLOGY | Facility: CLINIC | Age: 30
End: 2025-06-17
Payer: COMMERCIAL

## 2025-06-17 VITALS
SYSTOLIC BLOOD PRESSURE: 112 MMHG | DIASTOLIC BLOOD PRESSURE: 70 MMHG | OXYGEN SATURATION: 99 % | RESPIRATION RATE: 18 BRPM | HEIGHT: 57 IN | BODY MASS INDEX: 30.63 KG/M2 | WEIGHT: 142 LBS | HEART RATE: 80 BPM

## 2025-06-17 DIAGNOSIS — Z98.891 STATUS POST CESAREAN SECTION: ICD-10-CM

## 2025-06-17 DIAGNOSIS — Z51.89 ENCOUNTER FOR WOUND RE-CHECK: Primary | ICD-10-CM

## 2025-06-17 PROCEDURE — 3078F DIAST BP <80 MM HG: CPT | Mod: S$GLB,,, | Performed by: ADVANCED PRACTICE MIDWIFE

## 2025-06-17 PROCEDURE — 99999 PR PBB SHADOW E&M-EST. PATIENT-LVL IV: CPT | Mod: PBBFAC,,, | Performed by: ADVANCED PRACTICE MIDWIFE

## 2025-06-17 PROCEDURE — 3008F BODY MASS INDEX DOCD: CPT | Mod: S$GLB,,, | Performed by: ADVANCED PRACTICE MIDWIFE

## 2025-06-17 PROCEDURE — 3074F SYST BP LT 130 MM HG: CPT | Mod: S$GLB,,, | Performed by: ADVANCED PRACTICE MIDWIFE

## 2025-06-17 PROCEDURE — 1159F MED LIST DOCD IN RCRD: CPT | Mod: S$GLB,,, | Performed by: ADVANCED PRACTICE MIDWIFE

## 2025-06-17 PROCEDURE — 99213 OFFICE O/P EST LOW 20 MIN: CPT | Mod: S$GLB,,, | Performed by: ADVANCED PRACTICE MIDWIFE

## 2025-06-18 NOTE — PROGRESS NOTES
Subjective     Patient ID: Dominique Tomlin is a 30 y.o. female.    Chief Complaint: Postpartum Care (Pt is 7 weeks 6 days post . Denies any fever, heavy bleeding, depression, readmission, bladder or bowel problems. Pt is breast and bottle feeding. Babies is doing well./)    Has a small area on the right side of her  scar that is open.  Dr Sosa looked at this last week when babies came in for Circ appointments and put a steri strip on the area  Pt states she can see and feel the staple at the opening.  Denies drainage, pain, fever/chills  Pt states she has been taking multiple long baths    Review of Systems   Constitutional: Negative.    HENT: Negative.     Eyes: Negative.    Respiratory: Negative.     Cardiovascular: Negative.    Gastrointestinal: Negative.    Endocrine: Negative.    Genitourinary: Negative.  Negative for dysuria, frequency, menstrual irregularity, menstrual problem, pelvic pain, urgency, vaginal bleeding and vaginal discharge.   Musculoskeletal: Negative.    Integumentary:  Negative.   Allergic/Immunologic: Negative.    Neurological: Negative.    Psychiatric/Behavioral: Negative.       Past Medical History:   Diagnosis Date    Acne     Idiopathic thrombocytopenic purpura (ITP)      Past Surgical History:   Procedure Laterality Date     SECTION N/A 10/4/2023    Procedure:  SECTION;  Surgeon: Jermaine Sosa DO;  Location: [facility] L&D;  Service: OB/GYN;  Laterality: N/A;     SECTION N/A 2025    Procedure:  SECTION;  Surgeon: Jermaine Sosa DO;  Location: [facility] L&D;  Service: OB/GYN;  Laterality: N/A;      OB History    Para Term  AB Living   2 2 1 1 0 3   SAB IAB Ectopic Multiple Live Births   0 0 0 1 3      # Outcome Date GA Lbr Wayne/2nd Weight Sex Type Anes PTL Lv   2A  25 35w6d  1.92 kg (4 lb 3.7 oz) M CS-LTranv Gen N SERA   2B  25 35w6d  2.15 kg (4 lb 11.8 oz) M CS-LTranv Gen N  SERA   1 Term 10/04/23 39w4d  2.897 kg (6 lb 6.2 oz) F CS-LTranv Gen N SERA      Current Outpatient Medications   Medication Instructions    ferrous sulfate 325 mg, Oral, 2 times daily    prenatal vit no.124/iron/folic (PRENATAL VITAMIN ORAL) 1 tablet, Daily         Objective   Vitals:    25 1314   BP: 112/70   Pulse: 80   Resp: 18     Wt Readings from Last 2 Encounters:   25 64.4 kg (142 lb)   25 59.9 kg (132 lb)      Physical Exam  Vitals reviewed.   Constitutional:       Appearance: Normal appearance.   HENT:      Head: Normocephalic.   Cardiovascular:      Rate and Rhythm: Normal rate.   Pulmonary:      Effort: Pulmonary effort is normal.   Abdominal:      General: Abdomen is flat.      Palpations: Abdomen is soft.      Comments: Incision healing well overall.  Small approx 1cm area on right side of incision that is open and absorbable staple seen protruding through.  No drainage or significant erythema.    Musculoskeletal:         General: Normal range of motion.      Cervical back: Normal range of motion.   Skin:     General: Skin is warm and dry.   Neurological:      Mental Status: She is alert and oriented to person, place, and time.   Psychiatric:         Mood and Affect: Mood normal.         Behavior: Behavior normal.        Assessment and Plan   1. Encounter for wound re-check    2. Status post  section    3. Lactating mother    Incision cleaned with peroxide.  Absorbable staple removed without difficulty and steri strip applied.  Keep incision clean/dry  No tub baths for 1-2 weeks   If incision begins draining or there is not improvement in healing, call for further instructions.     Follow up if symptoms worsen or fail to improve.

## (undated) DEVICE — STAPLER SKIN SUBCUTICULAR

## (undated) DEVICE — SUT COAT VICRYL 0 CTX 27IN

## (undated) DEVICE — SUT 1 36IN CHROMIC GUT CTX

## (undated) DEVICE — GLOVE 7.0 PROTEXIS PI BLUE

## (undated) DEVICE — SYR BULB 60CC IRRIGATION

## (undated) DEVICE — SUT MONOCRYL 3-0 SH U/D

## (undated) DEVICE — SUT 2/0 27IN PLAIN GUT CT

## (undated) DEVICE — BELLOW CANN HEMOBLAST 1.65GR

## (undated) DEVICE — SUT CHROMIC 0 CT-1

## (undated) DEVICE — CANISTER SUCTION 3000CC

## (undated) DEVICE — SUT 0 36IN COATED VICRYL UN

## (undated) DEVICE — DRAPE L&D NON STERILE (ORDER 63957)

## (undated) DEVICE — GLOVE 6.5 PROTEXIS PI MICRO

## (undated) DEVICE — SUT 2/0 36IN COATED VICRYL

## (undated) DEVICE — PAD GROUNDING DISPOABLE

## (undated) DEVICE — SUTURE MONOCRYL 1 CT-1

## (undated) DEVICE — SPONGE LAP STRL 18X18 5/PK

## (undated) DEVICE — SOL NACL IRR 1000ML BTL

## (undated) DEVICE — APPLICATOR CHLORAPREP TINTED ORANGE 26ML (FOR PAIN TX ORDERS

## (undated) DEVICE — APPLICATOR CHLORAPREP ORN 26ML

## (undated) DEVICE — SUTURE MONOCRYL 3-0 STRAIGHT 27 ABSORB Y523H"

## (undated) DEVICE — SOL IRRIGATION SALINE 0.9% 1000ML BOTTLE

## (undated) DEVICE — SUT 0 60IN PDS II VIO MONO

## (undated) DEVICE — SUT MONOCRYL 3-0 SH 27 UND"

## (undated) DEVICE — PACK C SECTION RUSH

## (undated) DEVICE — PAD GROUND ELECTROD DISP VALLEY

## (undated) DEVICE — CANISTER SUCTION MEDIVAC RIGID 1200CC W/FIL

## (undated) DEVICE — CLAMP CORD UMBILICAL STL

## (undated) DEVICE — CLAMP CORD STERILE

## (undated) DEVICE — Device

## (undated) DEVICE — CANISTER MEDI-VAC SUC 1200CC